# Patient Record
Sex: MALE | Race: BLACK OR AFRICAN AMERICAN | NOT HISPANIC OR LATINO | URBAN - METROPOLITAN AREA
[De-identification: names, ages, dates, MRNs, and addresses within clinical notes are randomized per-mention and may not be internally consistent; named-entity substitution may affect disease eponyms.]

---

## 2017-01-25 PROBLEM — Z00.00 ENCOUNTER FOR PREVENTIVE HEALTH EXAMINATION: Status: ACTIVE | Noted: 2017-01-25

## 2017-01-26 ENCOUNTER — OUTPATIENT (OUTPATIENT)
Dept: OUTPATIENT SERVICES | Facility: HOSPITAL | Age: 27
LOS: 1 days | End: 2017-01-26
Payer: COMMERCIAL

## 2017-01-26 ENCOUNTER — APPOINTMENT (OUTPATIENT)
Dept: ULTRASOUND IMAGING | Facility: CLINIC | Age: 27
End: 2017-01-26

## 2017-01-26 ENCOUNTER — APPOINTMENT (OUTPATIENT)
Dept: ULTRASOUND IMAGING | Facility: HOSPITAL | Age: 27
End: 2017-01-26

## 2017-01-26 DIAGNOSIS — I25.10 ATHEROSCLEROTIC HEART DISEASE OF NATIVE CORONARY ARTERY WITHOUT ANGINA PECTORIS: ICD-10-CM

## 2017-01-26 DIAGNOSIS — R91.1 SOLITARY PULMONARY NODULE: ICD-10-CM

## 2017-01-26 PROCEDURE — 76536 US EXAM OF HEAD AND NECK: CPT | Mod: 26

## 2017-01-26 PROCEDURE — 76536 US EXAM OF HEAD AND NECK: CPT

## 2017-06-11 ENCOUNTER — EMERGENCY (EMERGENCY)
Facility: HOSPITAL | Age: 27
LOS: 1 days | Discharge: HOME | End: 2017-06-11
Attending: EMERGENCY MEDICINE | Admitting: EMERGENCY MEDICINE
Payer: COMMERCIAL

## 2017-06-11 VITALS
SYSTOLIC BLOOD PRESSURE: 147 MMHG | DIASTOLIC BLOOD PRESSURE: 74 MMHG | TEMPERATURE: 98 F | HEART RATE: 90 BPM | OXYGEN SATURATION: 98 % | RESPIRATION RATE: 18 BRPM

## 2017-06-11 DIAGNOSIS — Z91.013 ALLERGY TO SEAFOOD: ICD-10-CM
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
## 2017-06-11 DIAGNOSIS — S39.012A STRAIN OF MUSCLE, FASCIA AND TENDON OF LOWER BACK, INITIAL ENCOUNTER: ICD-10-CM
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
## 2017-06-11 DIAGNOSIS — M54.5 LOW BACK PAIN: ICD-10-CM

## 2017-06-11 DIAGNOSIS — J45.909 UNSPECIFIED ASTHMA, UNCOMPLICATED: ICD-10-CM

## 2017-06-11 DIAGNOSIS — Z79.899 OTHER LONG TERM (CURRENT) DRUG THERAPY: ICD-10-CM
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
## 2017-06-11 DIAGNOSIS — S29.012A STRAIN OF MUSCLE AND TENDON OF BACK WALL OF THORAX, INITIAL ENCOUNTER: ICD-10-CM

## 2017-06-11 DIAGNOSIS — M54.6 PAIN IN THORACIC SPINE: ICD-10-CM
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
## 2017-06-11 PROCEDURE — 99283 EMERGENCY DEPT VISIT LOW MDM: CPT

## 2017-06-11 RX ORDER — IBUPROFEN 200 MG
800 TABLET ORAL ONCE
Qty: 0 | Refills: 0 | Status: COMPLETED | OUTPATIENT
Start: 2017-06-11 | End: 2017-06-11

## 2017-06-11 RX ADMIN — Medication 800 MILLIGRAM(S): at 20:45

## 2017-06-11 NOTE — ED PROVIDER NOTE - MEDICAL DECISION MAKING DETAILS
MD Cory,Attending: pt seen as above. thoraco-lumbar paraspinal strain. for nsaids and d/c for same and PMD followup. return for uncontrolled pain or other concerns

## 2017-06-11 NOTE — ED ADULT NURSE NOTE - OBJECTIVE STATEMENT
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

## 2017-06-11 NOTE — ED PROVIDER NOTE - PLAN OF CARE
take motrin (ibuprofen 600-800 mg with food every 6-8 hours for pain. Return for uncontrolled pain. weakness/numbness/tingling or any other concerns. Recheck by your doctor mid week. Limit activity next 2- days

## 2017-06-11 NOTE — ED PROVIDER NOTE - CARE PLAN
Principal Discharge DX:	Back strain, initial encounter  Instructions for follow-up, activity and diet:	take motrin (ibuprofen 600-800 mg with food every 6-8 hours for pain. Return for uncontrolled pain. weakness/numbness/tingling or any other concerns. Recheck by your doctor mid week. Limit activity next 2- days

## 2017-06-11 NOTE — ED PROVIDER NOTE - PHYSICAL EXAMINATION
tender to palpation along Left paraspinal muscles from upper lumber to thoracic area. No midline tenderness. NVI and ambulatory without difficulty.

## 2017-06-11 NOTE — ED PROVIDER NOTE - OBJECTIVE STATEMENT
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 Pain shoots up and down L side form lumbar to thorcic area. worse with twisting. No difficulty walking. No neuro  or extrem sxs.   PMHX/Meds/All: none

## 2017-06-15 PROCEDURE — 99283 EMERGENCY DEPT VISIT LOW MDM: CPT

## 2017-06-15 PROCEDURE — 87389 HIV-1 AG W/HIV-1&-2 AB AG IA: CPT

## 2017-06-25 ENCOUNTER — OUTPATIENT (OUTPATIENT)
Dept: OUTPATIENT SERVICES | Facility: HOSPITAL | Age: 27
LOS: 1 days | End: 2017-06-25
Payer: COMMERCIAL

## 2017-06-25 ENCOUNTER — APPOINTMENT (OUTPATIENT)
Dept: MRI IMAGING | Facility: CLINIC | Age: 27
End: 2017-06-25

## 2017-06-25 DIAGNOSIS — Z00.8 ENCOUNTER FOR OTHER GENERAL EXAMINATION: ICD-10-CM

## 2017-06-25 PROCEDURE — 72146 MRI CHEST SPINE W/O DYE: CPT

## 2017-06-25 PROCEDURE — 72148 MRI LUMBAR SPINE W/O DYE: CPT

## 2018-03-30 ENCOUNTER — OUTPATIENT (OUTPATIENT)
Dept: OUTPATIENT SERVICES | Facility: HOSPITAL | Age: 28
LOS: 1 days | End: 2018-03-30
Payer: COMMERCIAL

## 2018-03-30 DIAGNOSIS — I82.409 ACUTE EMBOLISM AND THROMBOSIS OF UNSPECIFIED DEEP VEINS OF UNSPECIFIED LOWER EXTREMITY: ICD-10-CM

## 2018-03-30 PROCEDURE — 73590 X-RAY EXAM OF LOWER LEG: CPT

## 2018-03-30 PROCEDURE — 73590 X-RAY EXAM OF LOWER LEG: CPT | Mod: 26,LT

## 2018-03-30 PROCEDURE — 93971 EXTREMITY STUDY: CPT

## 2018-03-30 PROCEDURE — 93971 EXTREMITY STUDY: CPT | Mod: 26

## 2018-04-06 ENCOUNTER — EMERGENCY (EMERGENCY)
Facility: HOSPITAL | Age: 28
LOS: 1 days | Discharge: HOME | End: 2018-04-06
Attending: EMERGENCY MEDICINE | Admitting: EMERGENCY MEDICINE
Payer: COMMERCIAL

## 2018-04-06 VITALS
OXYGEN SATURATION: 100 % | WEIGHT: 270.07 LBS | DIASTOLIC BLOOD PRESSURE: 85 MMHG | TEMPERATURE: 98 F | HEIGHT: 68 IN | SYSTOLIC BLOOD PRESSURE: 135 MMHG | RESPIRATION RATE: 18 BRPM | HEART RATE: 92 BPM

## 2018-04-06 PROCEDURE — 73030 X-RAY EXAM OF SHOULDER: CPT

## 2018-04-06 PROCEDURE — 73030 X-RAY EXAM OF SHOULDER: CPT | Mod: 26,RT

## 2018-04-06 PROCEDURE — 99283 EMERGENCY DEPT VISIT LOW MDM: CPT

## 2018-04-06 RX ORDER — IBUPROFEN 200 MG
600 TABLET ORAL ONCE
Qty: 0 | Refills: 0 | Status: COMPLETED | OUTPATIENT
Start: 2018-04-06 | End: 2018-04-06

## 2018-04-06 RX ORDER — ACETAMINOPHEN 500 MG
975 TABLET ORAL ONCE
Qty: 0 | Refills: 0 | Status: COMPLETED | OUTPATIENT
Start: 2018-04-06 | End: 2018-04-06

## 2018-04-06 RX ADMIN — Medication 600 MILLIGRAM(S): at 20:39

## 2018-04-06 RX ADMIN — Medication 975 MILLIGRAM(S): at 20:39

## 2018-04-06 NOTE — ED ADULT NURSE NOTE - OBJECTIVE STATEMENT
Presents c/o right shoulder pain s/p lifting patient. Pt A&Ox3. ROBERTS. +pulses. +sensation. Denies cp/sob. Respirations even/unlabored. Abd soft. No n/v/d. Denies urinary symptoms. Skin WDI.

## 2018-04-06 NOTE — ED PROVIDER NOTE - MEDICAL DECISION MAKING DETAILS
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 He stated he was helping to transfer pt to a bed from a chair and she felt weak like a "dead weight", and had to lift her up to keep her from falling to the ground and to transfer her onto the bed. c/o R shoulder pain suddenly after the incident today. Denies neck or back pain. Denies radiating pain. Denies sensory changes or weakness to extremities. Denies CP/SOB/ABD pain or N/V. Denies sensory changes or weakness to extremities.  DDx: sprain, rotator cuff injury, fx  Plan: xray shoulder, nsaids

## 2018-04-06 NOTE — ED PROVIDER NOTE - GASTROINTESTINAL NEGATIVE STATEMENT, MLM
no abdominal pain, no bloating, no constipation, no diarrhea, no nausea and no vomiting.
(3) no apparent problem

## 2018-04-06 NOTE — ED PROVIDER NOTE - PHYSICAL EXAMINATION
NAD, VSS, Afebrile, Obese, No spinal tender, + Right sided shoulder generalized tender without obvious swelling, full ROMs, N/V- intact.

## 2018-04-06 NOTE — ED PROVIDER NOTE - OBJECTIVE STATEMENT
27yo male employee, ambulatory, no significant PMHx c/o right dominant shoulder pain after lifting pt today. He stated he held pt from fall and lifted pt to the bed. Denies other injuries. Denies neck or back pain. Denies radiating pain. Denies sensory changes or weakness to extremities. Denies CP/SOB/ABD pain or N/V. Denies sensory changes or weakness to extremities.

## 2018-04-20 ENCOUNTER — OUTPATIENT (OUTPATIENT)
Dept: OUTPATIENT SERVICES | Facility: HOSPITAL | Age: 28
LOS: 1 days | End: 2018-04-20
Payer: COMMERCIAL

## 2018-04-20 DIAGNOSIS — R05 COUGH: ICD-10-CM

## 2018-04-20 PROCEDURE — 71046 X-RAY EXAM CHEST 2 VIEWS: CPT | Mod: 26

## 2018-04-20 PROCEDURE — 71046 X-RAY EXAM CHEST 2 VIEWS: CPT

## 2018-05-06 ENCOUNTER — OUTPATIENT (OUTPATIENT)
Dept: OUTPATIENT SERVICES | Facility: HOSPITAL | Age: 28
LOS: 1 days | End: 2018-05-06
Payer: COMMERCIAL

## 2018-05-06 ENCOUNTER — APPOINTMENT (OUTPATIENT)
Dept: MRI IMAGING | Facility: IMAGING CENTER | Age: 28
End: 2018-05-06
Payer: COMMERCIAL

## 2018-05-06 DIAGNOSIS — Z00.8 ENCOUNTER FOR OTHER GENERAL EXAMINATION: ICD-10-CM

## 2018-05-06 PROCEDURE — 73721 MRI JNT OF LWR EXTRE W/O DYE: CPT

## 2018-05-06 PROCEDURE — 73721 MRI JNT OF LWR EXTRE W/O DYE: CPT | Mod: 26,LT

## 2018-09-23 ENCOUNTER — EMERGENCY (EMERGENCY)
Facility: HOSPITAL | Age: 28
LOS: 1 days | Discharge: HOME | End: 2018-09-23
Attending: EMERGENCY MEDICINE
Payer: COMMERCIAL

## 2018-09-23 VITALS
TEMPERATURE: 99 F | RESPIRATION RATE: 18 BRPM | OXYGEN SATURATION: 97 % | HEART RATE: 98 BPM | SYSTOLIC BLOOD PRESSURE: 142 MMHG | DIASTOLIC BLOOD PRESSURE: 88 MMHG

## 2018-09-23 PROCEDURE — 99283 EMERGENCY DEPT VISIT LOW MDM: CPT

## 2018-09-23 RX ORDER — IBUPROFEN 200 MG
600 TABLET ORAL ONCE
Qty: 0 | Refills: 0 | Status: COMPLETED | OUTPATIENT
Start: 2018-09-23 | End: 2018-09-23

## 2018-09-23 RX ORDER — ACETAMINOPHEN 500 MG
975 TABLET ORAL ONCE
Qty: 0 | Refills: 0 | Status: COMPLETED | OUTPATIENT
Start: 2018-09-23 | End: 2018-09-23

## 2018-09-23 RX ADMIN — Medication 600 MILLIGRAM(S): at 20:13

## 2018-09-23 RX ADMIN — Medication 975 MILLIGRAM(S): at 20:13

## 2018-09-23 NOTE — ED PROVIDER NOTE - PROGRESS NOTE DETAILS
The patient was re-examined after interventions and is feeling much better.  The patient will follow up with their primary physician this week.

## 2018-09-23 NOTE — ED ADULT NURSE NOTE - NSIMPLEMENTINTERV_GEN_ALL_ED
Implemented All Universal Safety Interventions:  [place] to call system. Call bell, personal items and telephone within reach. Instruct patient to call for assistance. Room bathroom lighting operational. Non-slip footwear when patient is off stretcher. Physically safe environment: no spills, clutter or unnecessary equipment. Stretcher in lowest position, wheels locked, appropriate side rails in place.

## 2018-09-23 NOTE — ED PROVIDER NOTE - OBJECTIVE STATEMENT
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 Pt stated he felt low back pain while he was cleaning pt. Denies fall or other injuries. Denies radiating pain, sensory changes or weakness to extremities. Denies headache or neck pain. Denies fever, chills or recent sickness. Denies CP/SOB/ABD pain or N/V/D. Denies urinary problems. 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 Pt stated he felt low back pain while he was cleaning pt. Denies fall or other injuries. Denies radiating pain, sensory changes or weakness to extremities. Denies headache or neck pain. Denies fever, chills or recent sickness. Denies CP/SOB/ABD pain or N/V/D. Denies urinary problems. No bowel or bladder dysfunction, no sensory or motor loss in legs, no recent back surgeries or injections, no history of intravenous drug abuse, no fevers, chills or night sweats. No saddle numbness with wiping or self exam.

## 2018-09-23 NOTE — ED PROVIDER NOTE - MEDICAL DECISION MAKING DETAILS
Will treat pain, encourage stretching exercises, gave patient red flag signs and causes to return to the emergency department emergently.   Will reassess pain and dispo as clinically indicated.

## 2018-09-23 NOTE — ED PROVIDER NOTE - MUSCULOSKELETAL, MLM
Spine appears normal, range of motion is limited to pain actively but normal to passive movement, no joint tenderness. + paraspinal tenderness to palpation over lumbar region

## 2018-09-23 NOTE — ED PROVIDER NOTE - ATTENDING CONTRIBUTION TO CARE
Agree with above, Josue Mcdermott MD.   The patient was re-examined after interventions and is feeling much better.  The patient will follow up with their primary physician this week.  No immediate life threatening issues present on history or clinical exam. Patient is a safe disposition home, has capacity and insight into their condition, is ambulatory in the Emergency Department with no further questions and will follow up with their doctor(s) this week. Patient  understands anticipatory guidance and was given strict return and follow up precautions. The patient has been informed of all concerning signs and symptoms to return to Emergency Department, the necessity to follow up with the PMD/Clinic/follow up provided within 2-3 days was explained, and the patient reports understanding of above with capacity and insight.

## 2018-09-23 NOTE — ED PROVIDER NOTE - PHYSICAL EXAMINATION
NAD, VSS, Afebrile, No spinal mid tender, B/L para lumbar tender, No sciatica tender, Neuro- intact.

## 2018-09-23 NOTE — ED ADULT NURSE NOTE - OBJECTIVE STATEMENT
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 Did not fall. Pain is medial lumber spine, nonradiating. Steady gait, no urinary incontinence, no paresthesias. Stretcher locked in low position. Advised of plan of care.

## 2018-10-02 ENCOUNTER — APPOINTMENT (OUTPATIENT)
Dept: SPORTS MEDICINE | Facility: CLINIC | Age: 28
End: 2018-10-02

## 2018-10-11 ENCOUNTER — TRANSCRIPTION ENCOUNTER (OUTPATIENT)
Age: 28
End: 2018-10-11

## 2019-11-01 ENCOUNTER — APPOINTMENT (OUTPATIENT)
Dept: SURGERY | Facility: CLINIC | Age: 29
End: 2019-11-01

## 2019-11-01 DIAGNOSIS — J45.909 UNSPECIFIED ASTHMA, UNCOMPLICATED: ICD-10-CM

## 2019-12-06 ENCOUNTER — APPOINTMENT (OUTPATIENT)
Dept: SURGERY | Facility: CLINIC | Age: 29
End: 2019-12-06
Payer: COMMERCIAL

## 2019-12-06 VITALS
HEART RATE: 74 BPM | RESPIRATION RATE: 16 BRPM | OXYGEN SATURATION: 98 % | BODY MASS INDEX: 46.68 KG/M2 | SYSTOLIC BLOOD PRESSURE: 144 MMHG | DIASTOLIC BLOOD PRESSURE: 89 MMHG | WEIGHT: 308 LBS | TEMPERATURE: 98.2 F | HEIGHT: 68 IN

## 2019-12-06 DIAGNOSIS — Z83.79 FAMILY HISTORY OF OTHER DISEASES OF THE DIGESTIVE SYSTEM: ICD-10-CM

## 2019-12-06 PROBLEM — J45.909 ASTHMA: Status: ACTIVE | Noted: 2019-12-06

## 2019-12-06 PROCEDURE — 99205 OFFICE O/P NEW HI 60 MIN: CPT

## 2019-12-06 RX ORDER — CHROMIUM 200 MCG
TABLET ORAL
Refills: 0 | Status: ACTIVE | COMMUNITY

## 2019-12-06 NOTE — ASSESSMENT
[FreeTextEntry1] : 29-year-old male 5 foot 6 308 pounds with a BMI of 46.8. He would like to be considered for a sleeve gastrectomy I believe he would be an excellent candidate he will undergo her usual medical nutritional and psychological workup paternal preoperative support group meetings and return for a second office visit one shoulders are completed with the risks benefits and expectations were discussed at length with the patient all his questions were

## 2019-12-06 NOTE — HISTORY OF PRESENT ILLNESS
[de-identified] :  GALO BRIONES is a 29 year old male here for consultation for weight loss surgery.A 29-year-old male for further 308 pounds with a BMI of 46.8 he has been heavy most of his adult life he has been on multiple programs in the past losing up to 10 pounds within one problems skin and that weight back he is looking for more permanent solution to his weight loss probable is interested in a sleeve gastrectomy\par

## 2020-01-15 ENCOUNTER — APPOINTMENT (OUTPATIENT)
Dept: ULTRASOUND IMAGING | Facility: IMAGING CENTER | Age: 30
End: 2020-01-15

## 2020-01-15 ENCOUNTER — APPOINTMENT (OUTPATIENT)
Dept: MAMMOGRAPHY | Facility: IMAGING CENTER | Age: 30
End: 2020-01-15

## 2020-02-05 NOTE — ED PROVIDER NOTE - NS ED ROS FT
Considering difficulty in sending Rx for Revlimid to [facility] via e-scribing, we faxed it to [facility] at [phone] attn Michael today. Fax confirmation received.    No neuro sxs  No skin or extrem sxs.

## 2020-02-28 ENCOUNTER — TRANSCRIPTION ENCOUNTER (OUTPATIENT)
Age: 30
End: 2020-02-28

## 2020-04-04 ENCOUNTER — APPOINTMENT (OUTPATIENT)
Dept: DISASTER EMERGENCY | Facility: CLINIC | Age: 30
End: 2020-04-04

## 2020-04-26 ENCOUNTER — MESSAGE (OUTPATIENT)
Age: 30
End: 2020-04-26

## 2020-05-04 LAB
SARS-COV-2 IGG SERPL IA-ACNC: 5.2 INDEX
SARS-COV-2 IGG SERPL QL IA: POSITIVE

## 2020-08-13 ENCOUNTER — TRANSCRIPTION ENCOUNTER (OUTPATIENT)
Age: 30
End: 2020-08-13

## 2020-09-16 ENCOUNTER — RESULT REVIEW (OUTPATIENT)
Age: 30
End: 2020-09-16

## 2020-10-19 DIAGNOSIS — K29.50 UNSPECIFIED CHRONIC GASTRITIS W/OUT BLEEDING: ICD-10-CM

## 2020-11-18 ENCOUNTER — TRANSCRIPTION ENCOUNTER (OUTPATIENT)
Age: 30
End: 2020-11-18

## 2020-12-23 ENCOUNTER — EMERGENCY (EMERGENCY)
Facility: HOSPITAL | Age: 30
LOS: 1 days | Discharge: HOME | End: 2020-12-23
Attending: STUDENT IN AN ORGANIZED HEALTH CARE EDUCATION/TRAINING PROGRAM
Payer: COMMERCIAL

## 2020-12-23 ENCOUNTER — NON-APPOINTMENT (OUTPATIENT)
Age: 30
End: 2020-12-23

## 2020-12-23 ENCOUNTER — APPOINTMENT (OUTPATIENT)
Dept: CARDIOLOGY | Facility: CLINIC | Age: 30
End: 2020-12-23
Payer: COMMERCIAL

## 2020-12-23 VITALS
WEIGHT: 307.99 LBS | RESPIRATION RATE: 18 BRPM | HEIGHT: 68 IN | OXYGEN SATURATION: 97 % | SYSTOLIC BLOOD PRESSURE: 139 MMHG | HEART RATE: 90 BPM | TEMPERATURE: 99 F | DIASTOLIC BLOOD PRESSURE: 84 MMHG

## 2020-12-23 VITALS
HEIGHT: 65 IN | RESPIRATION RATE: 16 BRPM | BODY MASS INDEX: 50.98 KG/M2 | WEIGHT: 306 LBS | DIASTOLIC BLOOD PRESSURE: 86 MMHG | TEMPERATURE: 97.3 F | HEART RATE: 91 BPM | SYSTOLIC BLOOD PRESSURE: 132 MMHG

## 2020-12-23 DIAGNOSIS — Z79.4 TYPE 2 DIABETES MELLITUS WITH SEVERE NONPROLIFERATIVE DIABETIC RETINOPATHY WITHOUT MACULAR EDEMA, RIGHT EYE: ICD-10-CM

## 2020-12-23 DIAGNOSIS — E11.3491 TYPE 2 DIABETES MELLITUS WITH SEVERE NONPROLIFERATIVE DIABETIC RETINOPATHY WITHOUT MACULAR EDEMA, RIGHT EYE: ICD-10-CM

## 2020-12-23 PROCEDURE — 93971 EXTREMITY STUDY: CPT

## 2020-12-23 PROCEDURE — 99072 ADDL SUPL MATRL&STAF TM PHE: CPT

## 2020-12-23 PROCEDURE — 99284 EMERGENCY DEPT VISIT MOD MDM: CPT | Mod: 25

## 2020-12-23 PROCEDURE — 99203 OFFICE O/P NEW LOW 30 MIN: CPT

## 2020-12-23 PROCEDURE — 93971 EXTREMITY STUDY: CPT | Mod: 26

## 2020-12-23 PROCEDURE — 99284 EMERGENCY DEPT VISIT MOD MDM: CPT

## 2020-12-23 PROCEDURE — 93000 ELECTROCARDIOGRAM COMPLETE: CPT | Mod: NC

## 2020-12-23 RX ORDER — CEPHALEXIN 500 MG
1 CAPSULE ORAL
Qty: 28 | Refills: 0
Start: 2020-12-23 | End: 2020-12-29

## 2020-12-23 RX ORDER — CEPHALEXIN 500 MG
500 CAPSULE ORAL ONCE
Refills: 0 | Status: COMPLETED | OUTPATIENT
Start: 2020-12-23 | End: 2020-12-23

## 2020-12-23 RX ORDER — IBUPROFEN 200 MG
600 TABLET ORAL ONCE
Refills: 0 | Status: COMPLETED | OUTPATIENT
Start: 2020-12-23 | End: 2020-12-23

## 2020-12-23 RX ADMIN — Medication 500 MILLIGRAM(S): at 19:36

## 2020-12-23 RX ADMIN — Medication 600 MILLIGRAM(S): at 17:33

## 2020-12-23 NOTE — ED ADULT NURSE NOTE - OBJECTIVE STATEMENT
Pt is 29 y/o male hx htn, DM presents to the ED for atraumatic left calf pain. patient endorsing no trauma or injury to the area. 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

## 2020-12-23 NOTE — ED PROVIDER NOTE - ATTENDING CONTRIBUTION TO CARE
atraumatic L leg pain on the calf, started yesterday w/ redness, no rf for DVT/PEwill have ultrasound and likely dc home

## 2020-12-23 NOTE — ED PROVIDER NOTE - CLINICAL SUMMARY MEDICAL DECISION MAKING FREE TEXT BOX
no inciting injury noticed yesterday w/ approx 8 cm by 2 cm area of erythema on the medial aspect of the lle, will tx w/ cephalexin if dvt sono negative. 2+ dp pulse no leg swelling, intact ROM of knee and ankle on the L side

## 2020-12-23 NOTE — ED PROVIDER NOTE - PATIENT PORTAL LINK FT
You can access the FollowMyHealth Patient Portal offered by [facility] by registering at the following website: [email or web address]. By joining [facility]’s FollowMyHealth portal, you will also be able to view your health information using other applications (apps) compatible with our system.

## 2020-12-23 NOTE — ED ADULT NURSE NOTE - NSIMPLEMENTINTERV_GEN_ALL_ED
Implemented All Universal Safety Interventions:  [place] to call system. Call bell, personal items and telephone within reach. Instruct patient to call for assistance. Room bathroom lighting operational. Non-slip footwear when patient is off stretcher. Physically safe environment: no spills, clutter or unnecessary equipment. Stretcher in lowest position, wheels locked, appropriate side rails in place.

## 2020-12-23 NOTE — ED PROVIDER NOTE - MUSCULOSKELETAL, MLM
Spine appears normal, range of motion is not limited, left calf with superficial ttp c/w thrombophlebitis,

## 2020-12-23 NOTE — ED PROVIDER NOTE - OBJECTIVE STATEMENT
29 y/o male hx htn, DM presents to the ED for atraumatic left calf pain. patient endorsing no trauma or injury to the area. 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

## 2020-12-28 PROBLEM — E11.3491: Status: ACTIVE | Noted: 2020-12-28

## 2020-12-28 RX ORDER — LISINOPRIL AND HYDROCHLOROTHIAZIDE TABLETS 20; 25 MG/1; MG/1
20-25 TABLET ORAL
Refills: 0 | Status: DISCONTINUED | COMMUNITY
End: 2020-12-28

## 2020-12-30 ENCOUNTER — APPOINTMENT (OUTPATIENT)
Dept: CARDIOLOGY | Facility: CLINIC | Age: 30
End: 2020-12-30
Payer: COMMERCIAL

## 2020-12-30 VITALS
TEMPERATURE: 97.3 F | BODY MASS INDEX: 50.98 KG/M2 | HEIGHT: 65 IN | DIASTOLIC BLOOD PRESSURE: 85 MMHG | WEIGHT: 306 LBS | HEART RATE: 87 BPM | RESPIRATION RATE: 16 BRPM | SYSTOLIC BLOOD PRESSURE: 126 MMHG

## 2020-12-30 DIAGNOSIS — R06.00 DYSPNEA, UNSPECIFIED: ICD-10-CM

## 2020-12-30 DIAGNOSIS — R01.1 CARDIAC MURMUR, UNSPECIFIED: ICD-10-CM

## 2020-12-30 PROCEDURE — 93306 TTE W/DOPPLER COMPLETE: CPT

## 2020-12-30 PROCEDURE — 99213 OFFICE O/P EST LOW 20 MIN: CPT | Mod: 25

## 2020-12-30 PROCEDURE — 99072 ADDL SUPL MATRL&STAF TM PHE: CPT

## 2020-12-30 PROCEDURE — 93015 CV STRESS TEST SUPVJ I&R: CPT

## 2021-01-27 PROBLEM — R06.00 DYSPNEA ON EXERTION: Status: ACTIVE | Noted: 2020-12-23

## 2021-01-27 PROBLEM — R01.1 MURMUR: Status: ACTIVE | Noted: 2020-12-23

## 2021-02-04 LAB
25(OH)D3 SERPL-MCNC: 31.9 NG/ML
ALBUMIN SERPL ELPH-MCNC: 4.3 G/DL
ALP BLD-CCNC: 46 U/L
ALT SERPL-CCNC: 15 U/L
ANION GAP SERPL CALC-SCNC: 12 MMOL/L
APTT BLD: 33.8 SEC
AST SERPL-CCNC: 15 U/L
BASOPHILS # BLD AUTO: 0.02 K/UL
BASOPHILS NFR BLD AUTO: 0.3 %
BILIRUB SERPL-MCNC: 0.2 MG/DL
BUN SERPL-MCNC: 11 MG/DL
CALCIUM SERPL-MCNC: 9.9 MG/DL
CHLORIDE SERPL-SCNC: 106 MMOL/L
CO2 SERPL-SCNC: 22 MMOL/L
CREAT SERPL-MCNC: 0.98 MG/DL
EOSINOPHIL # BLD AUTO: 0.06 K/UL
EOSINOPHIL NFR BLD AUTO: 0.8 %
ESTIMATED AVERAGE GLUCOSE: 126 MG/DL
FOLATE SERPL-MCNC: 8.2 NG/ML
GLUCOSE SERPL-MCNC: 95 MG/DL
HBA1C MFR BLD HPLC: 6 %
HCT VFR BLD CALC: 39.5 %
HGB BLD-MCNC: 13 G/DL
IMM GRANULOCYTES NFR BLD AUTO: 0.6 %
INR PPP: 1.04 RATIO
IRON SERPL-MCNC: 56 UG/DL
LYMPHOCYTES # BLD AUTO: 2.2 K/UL
LYMPHOCYTES NFR BLD AUTO: 30.5 %
MAN DIFF?: NORMAL
MCHC RBC-ENTMCNC: 29.5 PG
MCHC RBC-ENTMCNC: 32.9 GM/DL
MCV RBC AUTO: 89.8 FL
MONOCYTES # BLD AUTO: 0.44 K/UL
MONOCYTES NFR BLD AUTO: 6.1 %
NEUTROPHILS # BLD AUTO: 4.45 K/UL
NEUTROPHILS NFR BLD AUTO: 61.7 %
PLATELET # BLD AUTO: 295 K/UL
POTASSIUM SERPL-SCNC: 4.5 MMOL/L
PROT SERPL-MCNC: 7.2 G/DL
PT BLD: 12.3 SEC
RBC # BLD: 4.4 M/UL
RBC # FLD: 14.4 %
SODIUM SERPL-SCNC: 139 MMOL/L
TSH SERPL-ACNC: 1.69 UIU/ML
VIT B12 SERPL-MCNC: 497 PG/ML
WBC # FLD AUTO: 7.21 K/UL

## 2021-02-08 ENCOUNTER — APPOINTMENT (OUTPATIENT)
Dept: ULTRASOUND IMAGING | Facility: HOSPITAL | Age: 31
End: 2021-02-08
Payer: COMMERCIAL

## 2021-02-08 ENCOUNTER — OUTPATIENT (OUTPATIENT)
Dept: OUTPATIENT SERVICES | Facility: HOSPITAL | Age: 31
LOS: 1 days | End: 2021-02-08
Payer: COMMERCIAL

## 2021-02-08 ENCOUNTER — RESULT REVIEW (OUTPATIENT)
Age: 31
End: 2021-02-08

## 2021-02-08 ENCOUNTER — APPOINTMENT (OUTPATIENT)
Dept: RADIOLOGY | Facility: HOSPITAL | Age: 31
End: 2021-02-08
Payer: COMMERCIAL

## 2021-02-08 DIAGNOSIS — Z01.818 ENCOUNTER FOR OTHER PREPROCEDURAL EXAMINATION: ICD-10-CM

## 2021-02-08 DIAGNOSIS — E66.01 MORBID (SEVERE) OBESITY DUE TO EXCESS CALORIES: ICD-10-CM

## 2021-02-08 LAB — VIT B1 SERPL-MCNC: 99.7 NMOL/L

## 2021-02-08 PROCEDURE — 74246 X-RAY XM UPR GI TRC 2CNTRST: CPT

## 2021-02-08 PROCEDURE — 76700 US EXAM ABDOM COMPLETE: CPT

## 2021-02-08 PROCEDURE — 76700 US EXAM ABDOM COMPLETE: CPT | Mod: 26

## 2021-02-08 PROCEDURE — 74246 X-RAY XM UPR GI TRC 2CNTRST: CPT | Mod: 26

## 2021-02-25 ENCOUNTER — OUTPATIENT (OUTPATIENT)
Dept: OUTPATIENT SERVICES | Facility: HOSPITAL | Age: 31
LOS: 1 days | End: 2021-02-25
Payer: COMMERCIAL

## 2021-02-25 VITALS
HEART RATE: 86 BPM | WEIGHT: 311.95 LBS | DIASTOLIC BLOOD PRESSURE: 76 MMHG | TEMPERATURE: 98 F | SYSTOLIC BLOOD PRESSURE: 121 MMHG | RESPIRATION RATE: 16 BRPM | HEIGHT: 68 IN | OXYGEN SATURATION: 98 %

## 2021-02-25 DIAGNOSIS — Z29.9 ENCOUNTER FOR PROPHYLACTIC MEASURES, UNSPECIFIED: ICD-10-CM

## 2021-02-25 DIAGNOSIS — Z01.818 ENCOUNTER FOR OTHER PREPROCEDURAL EXAMINATION: ICD-10-CM

## 2021-02-25 DIAGNOSIS — G47.33 OBSTRUCTIVE SLEEP APNEA (ADULT) (PEDIATRIC): ICD-10-CM

## 2021-02-25 DIAGNOSIS — E66.01 MORBID (SEVERE) OBESITY DUE TO EXCESS CALORIES: ICD-10-CM

## 2021-02-25 DIAGNOSIS — E11.9 TYPE 2 DIABETES MELLITUS WITHOUT COMPLICATIONS: ICD-10-CM

## 2021-02-25 LAB
A1C WITH ESTIMATED AVERAGE GLUCOSE RESULT: 6.2 % — HIGH (ref 4–5.6)
ALBUMIN SERPL ELPH-MCNC: 4.2 G/DL — SIGNIFICANT CHANGE UP (ref 3.3–5)
ALP SERPL-CCNC: 47 U/L — SIGNIFICANT CHANGE UP (ref 40–120)
ALT FLD-CCNC: 13 U/L — SIGNIFICANT CHANGE UP (ref 10–45)
ANION GAP SERPL CALC-SCNC: 14 MMOL/L — SIGNIFICANT CHANGE UP (ref 5–17)
AST SERPL-CCNC: 14 U/L — SIGNIFICANT CHANGE UP (ref 10–40)
BILIRUB SERPL-MCNC: 0.2 MG/DL — SIGNIFICANT CHANGE UP (ref 0.2–1.2)
BLD GP AB SCN SERPL QL: NEGATIVE — SIGNIFICANT CHANGE UP
BUN SERPL-MCNC: 15 MG/DL — SIGNIFICANT CHANGE UP (ref 7–23)
CALCIUM SERPL-MCNC: 9.1 MG/DL — SIGNIFICANT CHANGE UP (ref 8.4–10.5)
CHLORIDE SERPL-SCNC: 103 MMOL/L — SIGNIFICANT CHANGE UP (ref 96–108)
CO2 SERPL-SCNC: 20 MMOL/L — LOW (ref 22–31)
CREAT SERPL-MCNC: 0.73 MG/DL — SIGNIFICANT CHANGE UP (ref 0.5–1.3)
ESTIMATED AVERAGE GLUCOSE: 131 MG/DL — HIGH (ref 68–114)
GLUCOSE SERPL-MCNC: 120 MG/DL — HIGH (ref 70–99)
HCT VFR BLD CALC: 41.9 % — SIGNIFICANT CHANGE UP (ref 39–50)
HGB BLD-MCNC: 13.6 G/DL — SIGNIFICANT CHANGE UP (ref 13–17)
MCHC RBC-ENTMCNC: 29.1 PG — SIGNIFICANT CHANGE UP (ref 27–34)
MCHC RBC-ENTMCNC: 32.5 GM/DL — SIGNIFICANT CHANGE UP (ref 32–36)
MCV RBC AUTO: 89.7 FL — SIGNIFICANT CHANGE UP (ref 80–100)
NRBC # BLD: 0 /100 WBCS — SIGNIFICANT CHANGE UP (ref 0–0)
PLATELET # BLD AUTO: 303 K/UL — SIGNIFICANT CHANGE UP (ref 150–400)
POTASSIUM SERPL-MCNC: 4 MMOL/L — SIGNIFICANT CHANGE UP (ref 3.5–5.3)
POTASSIUM SERPL-SCNC: 4 MMOL/L — SIGNIFICANT CHANGE UP (ref 3.5–5.3)
PROT SERPL-MCNC: 7.8 G/DL — SIGNIFICANT CHANGE UP (ref 6–8.3)
RBC # BLD: 4.67 M/UL — SIGNIFICANT CHANGE UP (ref 4.2–5.8)
RBC # FLD: 14.4 % — SIGNIFICANT CHANGE UP (ref 10.3–14.5)
RH IG SCN BLD-IMP: POSITIVE — SIGNIFICANT CHANGE UP
SODIUM SERPL-SCNC: 137 MMOL/L — SIGNIFICANT CHANGE UP (ref 135–145)
WBC # BLD: 7.22 K/UL — SIGNIFICANT CHANGE UP (ref 3.8–10.5)
WBC # FLD AUTO: 7.22 K/UL — SIGNIFICANT CHANGE UP (ref 3.8–10.5)

## 2021-02-25 PROCEDURE — G0463: CPT

## 2021-02-25 PROCEDURE — 86901 BLOOD TYPING SEROLOGIC RH(D): CPT

## 2021-02-25 PROCEDURE — 80053 COMPREHEN METABOLIC PANEL: CPT

## 2021-02-25 PROCEDURE — 86900 BLOOD TYPING SEROLOGIC ABO: CPT

## 2021-02-25 PROCEDURE — 86850 RBC ANTIBODY SCREEN: CPT

## 2021-02-25 PROCEDURE — 85027 COMPLETE CBC AUTOMATED: CPT

## 2021-02-25 PROCEDURE — 83036 HEMOGLOBIN GLYCOSYLATED A1C: CPT

## 2021-02-25 RX ORDER — CEFAZOLIN SODIUM 1 G
3000 VIAL (EA) INJECTION ONCE
Refills: 0 | Status: COMPLETED | OUTPATIENT
Start: 2021-03-10 | End: 2021-03-10

## 2021-02-25 NOTE — H&P PST ADULT - NSICDXPROBLEM_GEN_ALL_CORE_FT
PROBLEM DIAGNOSES  Problem: Morbid obesity  Assessment and Plan: Laparoscopic vertical sleeve gastrectomy  Preop cbc, cmp, aic, t/s sent  Pending preop covid test on 03/08/2021    Problem: ALEX on CPAP  Assessment and Plan: Informed OR booking for precautions.     Problem: DM (diabetes mellitus)  Assessment and Plan: Will follow up with labs/ fingerstick.   HgA1c ordered   Instructed to hold metformin,last dose 03/09/21  STAT FS  on the day of surgery ordered     Problem: Prophylactic measure  Assessment and Plan: The Caprini score indicates this patient is at risk for a VTE event (score 3-5).  Most surgical patients in this group would benefit from pharmacologic prophylaxis.  The surgical team will determine the balance between VTE risk and bleeding risk        PROBLEM DIAGNOSES  Problem: ALEX on CPAP  Assessment and Plan: Informed OR booking for precautions.     Problem: Morbid obesity  Assessment and Plan: Laparoscopic vertical sleeve gastrectomy  Preop cbc, cmp, aic, t/s sent  Pending preop covid test on 03/08/2021   GATORADE G2, STOP DRINKING 2 HOURS BEFORE THE ARRIVAL TIME    Problem: DM (diabetes mellitus)  Assessment and Plan: Will follow up with labs/ fingerstick.   HgA1c ordered   Instructed to hold metformin,last dose 03/09/21  STAT FS  on the day of surgery ordered     Problem: Prophylactic measure  Assessment and Plan: The Caprini score indicates this patient is at risk for a VTE event (score 3-5).  Most surgical patients in this group would benefit from pharmacologic prophylaxis.  The surgical team will determine the balance between VTE risk and bleeding risk

## 2021-02-25 NOTE — H&P PST ADULT - HISTORY OF PRESENT ILLNESS
31 year old male with morbid obesity, HTN, DM-2 , asthma (controlled ,"last inhaler use years ago")  & had previous Covid infection( had  loss of taste & smell only for 2 weeks in 03/2021) reports having laparoscopic vertical sleeve gastrectomy on 03/10/21.   ***Pending covid test.  Patient  denies travel outside the country.  Denies  fever, cough, shortness of breadth, diarrhea, throat pain or any rash.  31 year old male with morbid obesity( BMI 47.4), HTN, DM-2 , asthma (controlled ,"last inhaler use years ago")  & had previous Covid infection( had  loss of taste & smell only for 2 weeks in 03/2021) reports having laparoscopic vertical sleeve gastrectomy on 03/10/21.   ***Pending covid test.  Patient  denies travel outside the country.  Denies  fever, cough, shortness of breadth, diarrhea, throat pain or any rash.

## 2021-02-25 NOTE — H&P PST ADULT - ASSESSMENT
DINESHI VTE 2.0 SCORE [CLOT updated 2019]    AGE RELATED RISK FACTORS                                                       MOBILITY RELATED FACTORS  [ ] Age 41-60 years                                            (1 Point)                    [ ] Bed rest                                                        (1 Point)  [ ] Age: 61-74 years                                           (2 Points)                  [ ] Plaster cast                                                   (2 Points)  [ ] Age= 75 years                                              (3 Points)                    [ ] Bed bound for more than 72 hours                 (2 Points)    DISEASE RELATED RISK FACTORS                                               GENDER SPECIFIC FACTORS  [ ] Edema in the lower extremities                       (1 Point)              [ ] Pregnancy                                                     (1 Point)  [ ] Varicose veins                                               (1 Point)                     [ ] Post-partum < 6 weeks                                   (1 Point)             [ x] BMI > 25 Kg/m2                                            (1 Point)                     [ ] Hormonal therapy  or oral contraception          (1 Point)                 [ ] Sepsis (in the previous month)                        (1 Point)               [ ] History of pregnancy complications                 (1 point)  [ ] Pneumonia or serious lung disease                                               [ ] Unexplained or recurrent                     (1 Point)           (in the previous month)                               (1 Point)  [ ] Abnormal pulmonary function test                     (1 Point)                 SURGERY RELATED RISK FACTORS  [ ] Acute myocardial infarction                              (1 Point)               [ ]  Section                                             (1 Point)  [ ] Congestive heart failure (in the previous month)  (1 Point)      [ ] Minor surgery                                                  (1 Point)   [ ] Inflammatory bowel disease                             (1 Point)               [ ] Arthroscopic surgery                                        (2 Points)  [ ] Central venous access                                      (2 Points)                [x ] General surgery lasting more than 45 minutes (2 points)  [ ] Malignancy- Present or previous                   (2 Points)                [ ] Elective arthroplasty                                         (5 points)    [ ] Stroke (in the previous month)                          (5 Points)                                                                                                                                                           HEMATOLOGY RELATED FACTORS                                                 TRAUMA RELATED RISK FACTORS  [ ] Prior episodes of VTE                                     (3 Points)                [ ] Fracture of the hip, pelvis, or leg                       (5 Points)  [ ] Positive family history for VTE                         (3 Points)             [ ] Acute spinal cord injury (in the previous month)  (5 Points)  [ ] Prothrombin 63670 A                                     (3 Points)               [ ] Paralysis  (less than 1 month)                             (5 Points)  [ ] Factor V Leiden                                             (3 Points)                  [ ] Multiple Trauma within 1 month                        (5 Points)  [ ] Lupus anticoagulants                                     (3 Points)                                                           [ ] Anticardiolipin antibodies                               (3 Points)                                                       [ ] High homocysteine in the blood                      (3 Points)                                             [ ] Other congenital or acquired thrombophilia      (3 Points)                                                [ ] Heparin induced thrombocytopenia                  (3 Points)                                     Total Score [       3   ]

## 2021-02-25 NOTE — H&P PST ADULT - NSICDXPASTMEDICALHX_GEN_ALL_CORE_FT
PAST MEDICAL HISTORY:  Asthma last inhaler use " years ago"    DM (diabetes mellitus) 2    Obesity, morbid      PAST MEDICAL HISTORY:  Asthma last inhaler use " years ago"    COVID-19 virus infection had loss of taste & smell for 2 weeks in 3/2021    DM (diabetes mellitus) 2    Morbid obesity BMI 47.4    Obesity, morbid

## 2021-03-04 PROBLEM — E66.01 MORBID (SEVERE) OBESITY DUE TO EXCESS CALORIES: Chronic | Status: ACTIVE | Noted: 2021-02-25

## 2021-03-04 PROBLEM — E11.9 TYPE 2 DIABETES MELLITUS WITHOUT COMPLICATIONS: Chronic | Status: ACTIVE | Noted: 2021-02-25

## 2021-03-04 PROBLEM — U07.1 COVID-19: Chronic | Status: ACTIVE | Noted: 2021-02-25

## 2021-03-05 ENCOUNTER — APPOINTMENT (OUTPATIENT)
Dept: SURGERY | Facility: CLINIC | Age: 31
End: 2021-03-05
Payer: COMMERCIAL

## 2021-03-05 VITALS
HEART RATE: 78 BPM | WEIGHT: 303 LBS | BODY MASS INDEX: 45.92 KG/M2 | OXYGEN SATURATION: 97 % | RESPIRATION RATE: 16 BRPM | TEMPERATURE: 98.6 F | HEIGHT: 68 IN | DIASTOLIC BLOOD PRESSURE: 81 MMHG | SYSTOLIC BLOOD PRESSURE: 149 MMHG

## 2021-03-05 PROCEDURE — 99072 ADDL SUPL MATRL&STAF TM PHE: CPT

## 2021-03-05 PROCEDURE — 99215 OFFICE O/P EST HI 40 MIN: CPT

## 2021-03-07 ENCOUNTER — OUTPATIENT (OUTPATIENT)
Dept: OUTPATIENT SERVICES | Facility: HOSPITAL | Age: 31
LOS: 1 days | End: 2021-03-07
Payer: COMMERCIAL

## 2021-03-07 DIAGNOSIS — Z11.52 ENCOUNTER FOR SCREENING FOR COVID-19: ICD-10-CM

## 2021-03-07 LAB — SARS-COV-2 RNA SPEC QL NAA+PROBE: SIGNIFICANT CHANGE UP

## 2021-03-07 PROCEDURE — U0005: CPT

## 2021-03-07 PROCEDURE — U0003: CPT

## 2021-03-07 PROCEDURE — C9803: CPT

## 2021-03-09 ENCOUNTER — TRANSCRIPTION ENCOUNTER (OUTPATIENT)
Age: 31
End: 2021-03-09

## 2021-03-10 ENCOUNTER — APPOINTMENT (OUTPATIENT)
Dept: SURGERY | Facility: HOSPITAL | Age: 31
End: 2021-03-10
Payer: COMMERCIAL

## 2021-03-10 ENCOUNTER — INPATIENT (INPATIENT)
Facility: HOSPITAL | Age: 31
LOS: 0 days | Discharge: HOME | DRG: 621 | End: 2021-03-11
Attending: SURGERY | Admitting: SURGERY
Payer: COMMERCIAL

## 2021-03-10 ENCOUNTER — RESULT REVIEW (OUTPATIENT)
Age: 31
End: 2021-03-10

## 2021-03-10 VITALS
DIASTOLIC BLOOD PRESSURE: 84 MMHG | OXYGEN SATURATION: 97 % | SYSTOLIC BLOOD PRESSURE: 144 MMHG | HEIGHT: 68 IN | TEMPERATURE: 98 F | WEIGHT: 301.59 LBS | HEART RATE: 59 BPM | RESPIRATION RATE: 18 BRPM

## 2021-03-10 DIAGNOSIS — E66.01 MORBID (SEVERE) OBESITY DUE TO EXCESS CALORIES: ICD-10-CM

## 2021-03-10 LAB
ANION GAP SERPL CALC-SCNC: 14 MMOL/L — SIGNIFICANT CHANGE UP (ref 5–17)
BASOPHILS # BLD AUTO: 0.02 K/UL — SIGNIFICANT CHANGE UP (ref 0–0.2)
BASOPHILS NFR BLD AUTO: 0.2 % — SIGNIFICANT CHANGE UP (ref 0–2)
BUN SERPL-MCNC: 15 MG/DL — SIGNIFICANT CHANGE UP (ref 7–23)
CALCIUM SERPL-MCNC: 9.3 MG/DL — SIGNIFICANT CHANGE UP (ref 8.4–10.5)
CHLORIDE SERPL-SCNC: 98 MMOL/L — SIGNIFICANT CHANGE UP (ref 96–108)
CO2 SERPL-SCNC: 20 MMOL/L — LOW (ref 22–31)
CREAT SERPL-MCNC: 0.89 MG/DL — SIGNIFICANT CHANGE UP (ref 0.5–1.3)
EOSINOPHIL # BLD AUTO: 0 K/UL — SIGNIFICANT CHANGE UP (ref 0–0.5)
EOSINOPHIL NFR BLD AUTO: 0 % — SIGNIFICANT CHANGE UP (ref 0–6)
GLUCOSE BLDC GLUCOMTR-MCNC: 107 MG/DL — HIGH (ref 70–99)
GLUCOSE BLDC GLUCOMTR-MCNC: 88 MG/DL — SIGNIFICANT CHANGE UP (ref 70–99)
GLUCOSE BLDC GLUCOMTR-MCNC: 90 MG/DL — SIGNIFICANT CHANGE UP (ref 70–99)
GLUCOSE SERPL-MCNC: 137 MG/DL — HIGH (ref 70–99)
HCT VFR BLD CALC: 38.3 % — LOW (ref 39–50)
HGB BLD-MCNC: 12.6 G/DL — LOW (ref 13–17)
IMM GRANULOCYTES NFR BLD AUTO: 0.5 % — SIGNIFICANT CHANGE UP (ref 0–1.5)
LYMPHOCYTES # BLD AUTO: 1.58 K/UL — SIGNIFICANT CHANGE UP (ref 1–3.3)
LYMPHOCYTES # BLD AUTO: 14.5 % — SIGNIFICANT CHANGE UP (ref 13–44)
MAGNESIUM SERPL-MCNC: 2.1 MG/DL — SIGNIFICANT CHANGE UP (ref 1.6–2.6)
MCHC RBC-ENTMCNC: 29.1 PG — SIGNIFICANT CHANGE UP (ref 27–34)
MCHC RBC-ENTMCNC: 32.9 GM/DL — SIGNIFICANT CHANGE UP (ref 32–36)
MCV RBC AUTO: 88.5 FL — SIGNIFICANT CHANGE UP (ref 80–100)
MONOCYTES # BLD AUTO: 0.26 K/UL — SIGNIFICANT CHANGE UP (ref 0–0.9)
MONOCYTES NFR BLD AUTO: 2.4 % — SIGNIFICANT CHANGE UP (ref 2–14)
NEUTROPHILS # BLD AUTO: 9.01 K/UL — HIGH (ref 1.8–7.4)
NEUTROPHILS NFR BLD AUTO: 82.4 % — HIGH (ref 43–77)
NRBC # BLD: 0 /100 WBCS — SIGNIFICANT CHANGE UP (ref 0–0)
PHOSPHATE SERPL-MCNC: 3.1 MG/DL — SIGNIFICANT CHANGE UP (ref 2.5–4.5)
PLATELET # BLD AUTO: 283 K/UL — SIGNIFICANT CHANGE UP (ref 150–400)
POTASSIUM SERPL-MCNC: 4.3 MMOL/L — SIGNIFICANT CHANGE UP (ref 3.5–5.3)
POTASSIUM SERPL-SCNC: 4.3 MMOL/L — SIGNIFICANT CHANGE UP (ref 3.5–5.3)
RBC # BLD: 4.33 M/UL — SIGNIFICANT CHANGE UP (ref 4.2–5.8)
RBC # FLD: 13.8 % — SIGNIFICANT CHANGE UP (ref 10.3–14.5)
RH IG SCN BLD-IMP: POSITIVE — SIGNIFICANT CHANGE UP
SODIUM SERPL-SCNC: 132 MMOL/L — LOW (ref 135–145)
WBC # BLD: 10.93 K/UL — HIGH (ref 3.8–10.5)
WBC # FLD AUTO: 10.93 K/UL — HIGH (ref 3.8–10.5)

## 2021-03-10 PROCEDURE — 43775 LAP SLEEVE GASTRECTOMY: CPT | Mod: 82

## 2021-03-10 PROCEDURE — 43775 LAP SLEEVE GASTRECTOMY: CPT

## 2021-03-10 PROCEDURE — 88305 TISSUE EXAM BY PATHOLOGIST: CPT | Mod: 26

## 2021-03-10 RX ORDER — DEXTROSE 50 % IN WATER 50 %
25 SYRINGE (ML) INTRAVENOUS ONCE
Refills: 0 | Status: DISCONTINUED | OUTPATIENT
Start: 2021-03-10 | End: 2021-03-11

## 2021-03-10 RX ORDER — ONDANSETRON 8 MG/1
4 TABLET, FILM COATED ORAL ONCE
Refills: 0 | Status: DISCONTINUED | OUTPATIENT
Start: 2021-03-10 | End: 2021-03-10

## 2021-03-10 RX ORDER — LIDOCAINE HCL 20 MG/ML
0.2 VIAL (ML) INJECTION ONCE
Refills: 0 | Status: DISCONTINUED | OUTPATIENT
Start: 2021-03-10 | End: 2021-03-10

## 2021-03-10 RX ORDER — GLUCAGON INJECTION, SOLUTION 0.5 MG/.1ML
1 INJECTION, SOLUTION SUBCUTANEOUS ONCE
Refills: 0 | Status: DISCONTINUED | OUTPATIENT
Start: 2021-03-10 | End: 2021-03-11

## 2021-03-10 RX ORDER — INSULIN LISPRO 100/ML
VIAL (ML) SUBCUTANEOUS EVERY 6 HOURS
Refills: 0 | Status: DISCONTINUED | OUTPATIENT
Start: 2021-03-10 | End: 2021-03-11

## 2021-03-10 RX ORDER — SODIUM CHLORIDE 9 MG/ML
1000 INJECTION, SOLUTION INTRAVENOUS
Refills: 0 | Status: DISCONTINUED | OUTPATIENT
Start: 2021-03-10 | End: 2021-03-11

## 2021-03-10 RX ORDER — ACETAMINOPHEN 500 MG
1000 TABLET ORAL ONCE
Refills: 0 | Status: COMPLETED | OUTPATIENT
Start: 2021-03-11 | End: 2021-03-11

## 2021-03-10 RX ORDER — HEPARIN SODIUM 5000 [USP'U]/ML
5000 INJECTION INTRAVENOUS; SUBCUTANEOUS EVERY 8 HOURS
Refills: 0 | Status: DISCONTINUED | OUTPATIENT
Start: 2021-03-10 | End: 2021-03-11

## 2021-03-10 RX ORDER — SODIUM CHLORIDE 9 MG/ML
3 INJECTION INTRAMUSCULAR; INTRAVENOUS; SUBCUTANEOUS EVERY 8 HOURS
Refills: 0 | Status: DISCONTINUED | OUTPATIENT
Start: 2021-03-10 | End: 2021-03-10

## 2021-03-10 RX ORDER — ONDANSETRON 8 MG/1
4 TABLET, FILM COATED ORAL EVERY 6 HOURS
Refills: 0 | Status: DISCONTINUED | OUTPATIENT
Start: 2021-03-10 | End: 2021-03-11

## 2021-03-10 RX ORDER — ACETAMINOPHEN 500 MG
1000 TABLET ORAL ONCE
Refills: 0 | Status: DISCONTINUED | OUTPATIENT
Start: 2021-03-11 | End: 2021-03-11

## 2021-03-10 RX ORDER — HYDROMORPHONE HYDROCHLORIDE 2 MG/ML
0.25 INJECTION INTRAMUSCULAR; INTRAVENOUS; SUBCUTANEOUS
Refills: 0 | Status: DISCONTINUED | OUTPATIENT
Start: 2021-03-10 | End: 2021-03-10

## 2021-03-10 RX ORDER — FOLIC ACID 0.8 MG
1 TABLET ORAL ONCE
Refills: 0 | Status: COMPLETED | OUTPATIENT
Start: 2021-03-10 | End: 2021-03-10

## 2021-03-10 RX ORDER — HALOPERIDOL DECANOATE 100 MG/ML
0.5 INJECTION INTRAMUSCULAR EVERY 4 HOURS
Refills: 0 | Status: DISCONTINUED | OUTPATIENT
Start: 2021-03-10 | End: 2021-03-11

## 2021-03-10 RX ORDER — FAMOTIDINE 10 MG/ML
20 INJECTION INTRAVENOUS ONCE
Refills: 0 | Status: COMPLETED | OUTPATIENT
Start: 2021-03-10 | End: 2021-03-10

## 2021-03-10 RX ORDER — DEXTROSE 50 % IN WATER 50 %
12.5 SYRINGE (ML) INTRAVENOUS ONCE
Refills: 0 | Status: DISCONTINUED | OUTPATIENT
Start: 2021-03-10 | End: 2021-03-11

## 2021-03-10 RX ORDER — KETOROLAC TROMETHAMINE 30 MG/ML
30 SYRINGE (ML) INJECTION EVERY 6 HOURS
Refills: 0 | Status: DISCONTINUED | OUTPATIENT
Start: 2021-03-10 | End: 2021-03-11

## 2021-03-10 RX ORDER — THIAMINE MONONITRATE (VIT B1) 100 MG
100 TABLET ORAL ONCE
Refills: 0 | Status: COMPLETED | OUTPATIENT
Start: 2021-03-10 | End: 2021-03-10

## 2021-03-10 RX ORDER — ACETAMINOPHEN 500 MG
1000 TABLET ORAL ONCE
Refills: 0 | Status: COMPLETED | OUTPATIENT
Start: 2021-03-10 | End: 2021-03-10

## 2021-03-10 RX ORDER — PANTOPRAZOLE SODIUM 20 MG/1
40 TABLET, DELAYED RELEASE ORAL EVERY 24 HOURS
Refills: 0 | Status: DISCONTINUED | OUTPATIENT
Start: 2021-03-10 | End: 2021-03-11

## 2021-03-10 RX ORDER — CEFAZOLIN SODIUM 1 G
3000 VIAL (EA) INJECTION EVERY 8 HOURS
Refills: 0 | Status: COMPLETED | OUTPATIENT
Start: 2021-03-10 | End: 2021-03-11

## 2021-03-10 RX ORDER — HYOSCYAMINE SULFATE 0.13 MG
0.12 TABLET ORAL EVERY 6 HOURS
Refills: 0 | Status: DISCONTINUED | OUTPATIENT
Start: 2021-03-10 | End: 2021-03-11

## 2021-03-10 RX ORDER — FENTANYL CITRATE 50 UG/ML
25 INJECTION INTRAVENOUS
Refills: 0 | Status: DISCONTINUED | OUTPATIENT
Start: 2021-03-10 | End: 2021-03-10

## 2021-03-10 RX ORDER — DIPHENHYDRAMINE HCL 50 MG
12.5 CAPSULE ORAL ONCE
Refills: 0 | Status: COMPLETED | OUTPATIENT
Start: 2021-03-10 | End: 2021-03-10

## 2021-03-10 RX ORDER — DEXTROSE 50 % IN WATER 50 %
15 SYRINGE (ML) INTRAVENOUS ONCE
Refills: 0 | Status: DISCONTINUED | OUTPATIENT
Start: 2021-03-10 | End: 2021-03-11

## 2021-03-10 RX ORDER — CHLORHEXIDINE GLUCONATE 213 G/1000ML
1 SOLUTION TOPICAL ONCE
Refills: 0 | Status: DISCONTINUED | OUTPATIENT
Start: 2021-03-10 | End: 2021-03-10

## 2021-03-10 RX ORDER — HEPARIN SODIUM 5000 [USP'U]/ML
5000 INJECTION INTRAVENOUS; SUBCUTANEOUS ONCE
Refills: 0 | Status: COMPLETED | OUTPATIENT
Start: 2021-03-10 | End: 2021-03-10

## 2021-03-10 RX ADMIN — Medication 30 MILLIGRAM(S): at 21:42

## 2021-03-10 RX ADMIN — Medication 0.12 MILLIGRAM(S): at 11:17

## 2021-03-10 RX ADMIN — Medication 30 MILLIGRAM(S): at 16:26

## 2021-03-10 RX ADMIN — Medication 12.5 MILLIGRAM(S): at 11:26

## 2021-03-10 RX ADMIN — ONDANSETRON 4 MILLIGRAM(S): 8 TABLET, FILM COATED ORAL at 23:37

## 2021-03-10 RX ADMIN — Medication 1 MILLIGRAM(S): at 12:09

## 2021-03-10 RX ADMIN — HEPARIN SODIUM 5000 UNIT(S): 5000 INJECTION INTRAVENOUS; SUBCUTANEOUS at 05:40

## 2021-03-10 RX ADMIN — Medication 0.12 MILLIGRAM(S): at 17:52

## 2021-03-10 RX ADMIN — Medication 400 MILLIGRAM(S): at 23:36

## 2021-03-10 RX ADMIN — HEPARIN SODIUM 5000 UNIT(S): 5000 INJECTION INTRAVENOUS; SUBCUTANEOUS at 13:51

## 2021-03-10 RX ADMIN — ONDANSETRON 4 MILLIGRAM(S): 8 TABLET, FILM COATED ORAL at 17:52

## 2021-03-10 RX ADMIN — FAMOTIDINE 20 MILLIGRAM(S): 10 INJECTION INTRAVENOUS at 11:26

## 2021-03-10 RX ADMIN — FENTANYL CITRATE 25 MICROGRAM(S): 50 INJECTION INTRAVENOUS at 13:00

## 2021-03-10 RX ADMIN — SODIUM CHLORIDE 150 MILLILITER(S): 9 INJECTION, SOLUTION INTRAVENOUS at 16:12

## 2021-03-10 RX ADMIN — PANTOPRAZOLE SODIUM 40 MILLIGRAM(S): 20 TABLET, DELAYED RELEASE ORAL at 11:17

## 2021-03-10 RX ADMIN — Medication 30 MILLIGRAM(S): at 16:11

## 2021-03-10 RX ADMIN — Medication 200 MILLIGRAM(S): at 17:52

## 2021-03-10 RX ADMIN — HEPARIN SODIUM 5000 UNIT(S): 5000 INJECTION INTRAVENOUS; SUBCUTANEOUS at 21:42

## 2021-03-10 RX ADMIN — SODIUM CHLORIDE 250 MILLILITER(S): 9 INJECTION, SOLUTION INTRAVENOUS at 11:17

## 2021-03-10 RX ADMIN — SODIUM CHLORIDE 150 MILLILITER(S): 9 INJECTION, SOLUTION INTRAVENOUS at 17:52

## 2021-03-10 RX ADMIN — Medication 400 MILLIGRAM(S): at 14:00

## 2021-03-10 RX ADMIN — Medication 0.12 MILLIGRAM(S): at 23:36

## 2021-03-10 RX ADMIN — Medication 100 MILLIGRAM(S): at 12:09

## 2021-03-10 RX ADMIN — ONDANSETRON 4 MILLIGRAM(S): 8 TABLET, FILM COATED ORAL at 11:17

## 2021-03-10 RX ADMIN — FENTANYL CITRATE 25 MICROGRAM(S): 50 INJECTION INTRAVENOUS at 12:45

## 2021-03-10 RX ADMIN — Medication 30 MILLIGRAM(S): at 22:00

## 2021-03-10 NOTE — BRIEF OPERATIVE NOTE - OPERATION/FINDINGS
laparoscopic sleeve gastrectomy. short gastric divided along greater curvature, proximal stomach  from left kaleb, 36Fr bougie in place, stomach stapled along greater curvature, negative leak test with methylene blue, omentum suture to edge of stomach, hemostasis achieved

## 2021-03-10 NOTE — CHART NOTE - NSCHARTNOTEFT_GEN_A_CORE
STATUS POST:  Laparoscopic sleeve gastrectomy     SUBJECTIVE: Pt seen in PACU 4 hours after procedure. Pt tolerated procedure well. Pt reports:  SOB:  [ ] YES [x ] NO  Chest Discomfort: [ ] YES [x ] NO    Nausea: [ x] YES [ ] NO           Vomiting: [ ] YES [ x] NO  Flatus: [ ] YES [ x] NO             Bowel Movement: [ ] YES [ x] NO  Diarrhea: [ ] YES [x ] NO         Void: [ ]YES [x ]No  Constipation: [ ] YES [ x] NO     Pain (0-10): 9/10              Pain Control Adequate: [ ] YES [x] NO      OBJECTIVE:  PHYSICAL EXAM:  Gen: AAOx3, NAD  HEENT: NC/AT  RESP: Non labored breathing  ABDOMEN: Soft, appropriately tender, obese abdomen. (+) Incision site dressing C/D/I    Vital Signs Last 24 Hrs  T(C): 36.3 (10 Mar 2021 13:00), Max: 36.7 (10 Mar 2021 05:55)  T(F): 97.3 (10 Mar 2021 13:00), Max: 98.1 (10 Mar 2021 05:55)  HR: 86 (10 Mar 2021 13:30) (59 - 93)  BP: 147/72 (10 Mar 2021 13:30) (122/78 - 175/85)  BP(mean): 101 (10 Mar 2021 13:30) (80 - 118)  RR: 18 (10 Mar 2021 13:30) (18 - 18)  SpO2: 99% (10 Mar 2021 13:30) (95% - 100%)      A/P: 31y Male s/p  Laparoscopic sleeve gastrectomy   - Diet: NPO  - Activity: OOBAAT  - AM Labs  - Pain medication STATUS POST:  Laparoscopic sleeve gastrectomy     SUBJECTIVE: Pt seen in PACU 4 hours after procedure. Pt tolerated procedure well. Pt reports:  SOB:  [ ] YES [x ] NO  Chest Discomfort: [ ] YES [x ] NO    Nausea: [ x] YES [ ] NO           Vomiting: [ ] YES [ x] NO  Flatus: [ ] YES [ x] NO             Bowel Movement: [ ] YES [ x] NO  Diarrhea: [ ] YES [x ] NO         Void: [ ]YES [x ]No  Constipation: [ ] YES [ x] NO     Pain (0-10): 9/10              Pain Control Adequate: [ ] YES [x] NO      OBJECTIVE:  PHYSICAL EXAM:  Gen: AAOx3, NAD  HEENT: NC/AT  RESP: Non labored breathing  ABDOMEN: Soft, appropriately tender, obese abdomen. (+) Incision site dressing C/D/I    Vital Signs Last 24 Hrs  T(C): 36.3 (10 Mar 2021 13:00), Max: 36.7 (10 Mar 2021 05:55)  T(F): 97.3 (10 Mar 2021 13:00), Max: 98.1 (10 Mar 2021 05:55)  HR: 86 (10 Mar 2021 13:30) (59 - 93)  BP: 147/72 (10 Mar 2021 13:30) (122/78 - 175/85)  BP(mean): 101 (10 Mar 2021 13:30) (80 - 118)  RR: 18 (10 Mar 2021 13:30) (18 - 18)  SpO2: 99% (10 Mar 2021 13:30) (95% - 100%)      A/P: 31y Male s/p  Laparoscopic sleeve gastrectomy   - Diet: NPO  - Activity: OOBAAT  - AM Labs  - Pain medication  - Pt cleared for the floors

## 2021-03-10 NOTE — PRE-ANESTHESIA EVALUATION ADULT - NSANTHPMHFT_GEN_ALL_CORE
31M with PMH of morbid obesity (BMI 48), HTN, T2DM (A1c 6.2), asthma (well controlled, last used inhaler years ago, never intubated), ALEX (on CPAP at night), covid infection (03/2020, loss of smell/taste, no hospitalization) here for above procedure. Pt has echo and PFTs in physical chart. PFT showing asthma. 31M with PMH of morbid obesity (BMI 48), HTN, T2DM (A1c 6.2), asthma (well controlled, last used inhaler years ago, never intubated), ALEX (on CPAP at night), covid infection (03/2020, loss of smell/taste, no hospitalization) here for above procedure. Pt has echo and PFTs in physical chart. PFT showing asthma. Pt never had anesthesia before, but reports no issues in the family with anesthesia. He is able to walk several blocks without chest pain or significant SOB.

## 2021-03-11 ENCOUNTER — TRANSCRIPTION ENCOUNTER (OUTPATIENT)
Age: 31
End: 2021-03-11

## 2021-03-11 VITALS
HEART RATE: 82 BPM | OXYGEN SATURATION: 98 % | RESPIRATION RATE: 18 BRPM | DIASTOLIC BLOOD PRESSURE: 72 MMHG | SYSTOLIC BLOOD PRESSURE: 121 MMHG | TEMPERATURE: 98 F

## 2021-03-11 LAB
ANION GAP SERPL CALC-SCNC: 11 MMOL/L — SIGNIFICANT CHANGE UP (ref 5–17)
BASOPHILS # BLD AUTO: 0 K/UL — SIGNIFICANT CHANGE UP (ref 0–0.2)
BASOPHILS NFR BLD AUTO: 0 % — SIGNIFICANT CHANGE UP (ref 0–2)
BUN SERPL-MCNC: 10 MG/DL — SIGNIFICANT CHANGE UP (ref 7–23)
CALCIUM SERPL-MCNC: 9.3 MG/DL — SIGNIFICANT CHANGE UP (ref 8.4–10.5)
CHLORIDE SERPL-SCNC: 104 MMOL/L — SIGNIFICANT CHANGE UP (ref 96–108)
CO2 SERPL-SCNC: 21 MMOL/L — LOW (ref 22–31)
CREAT SERPL-MCNC: 0.85 MG/DL — SIGNIFICANT CHANGE UP (ref 0.5–1.3)
EOSINOPHIL # BLD AUTO: 0 K/UL — SIGNIFICANT CHANGE UP (ref 0–0.5)
EOSINOPHIL NFR BLD AUTO: 0 % — SIGNIFICANT CHANGE UP (ref 0–6)
GLUCOSE BLDC GLUCOMTR-MCNC: 88 MG/DL — SIGNIFICANT CHANGE UP (ref 70–99)
GLUCOSE BLDC GLUCOMTR-MCNC: 95 MG/DL — SIGNIFICANT CHANGE UP (ref 70–99)
GLUCOSE SERPL-MCNC: 90 MG/DL — SIGNIFICANT CHANGE UP (ref 70–99)
HCT VFR BLD CALC: 35.8 % — LOW (ref 39–50)
HGB BLD-MCNC: 12.2 G/DL — LOW (ref 13–17)
IMM GRANULOCYTES NFR BLD AUTO: 0.5 % — SIGNIFICANT CHANGE UP (ref 0–1.5)
LYMPHOCYTES # BLD AUTO: 1.26 K/UL — SIGNIFICANT CHANGE UP (ref 1–3.3)
LYMPHOCYTES # BLD AUTO: 16.3 % — SIGNIFICANT CHANGE UP (ref 13–44)
MAGNESIUM SERPL-MCNC: 2.3 MG/DL — SIGNIFICANT CHANGE UP (ref 1.6–2.6)
MCHC RBC-ENTMCNC: 29.8 PG — SIGNIFICANT CHANGE UP (ref 27–34)
MCHC RBC-ENTMCNC: 34.1 GM/DL — SIGNIFICANT CHANGE UP (ref 32–36)
MCV RBC AUTO: 87.3 FL — SIGNIFICANT CHANGE UP (ref 80–100)
MONOCYTES # BLD AUTO: 0.58 K/UL — SIGNIFICANT CHANGE UP (ref 0–0.9)
MONOCYTES NFR BLD AUTO: 7.5 % — SIGNIFICANT CHANGE UP (ref 2–14)
NEUTROPHILS # BLD AUTO: 5.86 K/UL — SIGNIFICANT CHANGE UP (ref 1.8–7.4)
NEUTROPHILS NFR BLD AUTO: 75.7 % — SIGNIFICANT CHANGE UP (ref 43–77)
NRBC # BLD: 0 /100 WBCS — SIGNIFICANT CHANGE UP (ref 0–0)
PHOSPHATE SERPL-MCNC: 3.1 MG/DL — SIGNIFICANT CHANGE UP (ref 2.5–4.5)
PLATELET # BLD AUTO: 270 K/UL — SIGNIFICANT CHANGE UP (ref 150–400)
POTASSIUM SERPL-MCNC: 3.8 MMOL/L — SIGNIFICANT CHANGE UP (ref 3.5–5.3)
POTASSIUM SERPL-SCNC: 3.8 MMOL/L — SIGNIFICANT CHANGE UP (ref 3.5–5.3)
RBC # BLD: 4.1 M/UL — LOW (ref 4.2–5.8)
RBC # FLD: 13.8 % — SIGNIFICANT CHANGE UP (ref 10.3–14.5)
SODIUM SERPL-SCNC: 136 MMOL/L — SIGNIFICANT CHANGE UP (ref 135–145)
WBC # BLD: 7.74 K/UL — SIGNIFICANT CHANGE UP (ref 3.8–10.5)
WBC # FLD AUTO: 7.74 K/UL — SIGNIFICANT CHANGE UP (ref 3.8–10.5)

## 2021-03-11 PROCEDURE — 83735 ASSAY OF MAGNESIUM: CPT

## 2021-03-11 PROCEDURE — 88305 TISSUE EXAM BY PATHOLOGIST: CPT

## 2021-03-11 PROCEDURE — 80048 BASIC METABOLIC PNL TOTAL CA: CPT

## 2021-03-11 PROCEDURE — 82962 GLUCOSE BLOOD TEST: CPT

## 2021-03-11 PROCEDURE — C1889: CPT

## 2021-03-11 PROCEDURE — 84100 ASSAY OF PHOSPHORUS: CPT

## 2021-03-11 PROCEDURE — C9399: CPT

## 2021-03-11 PROCEDURE — 85025 COMPLETE CBC W/AUTO DIFF WBC: CPT

## 2021-03-11 RX ORDER — OMEPRAZOLE 10 MG/1
1 CAPSULE, DELAYED RELEASE ORAL
Qty: 30 | Refills: 0
Start: 2021-03-11 | End: 2021-04-09

## 2021-03-11 RX ORDER — LISINOPRIL 2.5 MG/1
1 TABLET ORAL
Qty: 0 | Refills: 0 | DISCHARGE

## 2021-03-11 RX ORDER — AMLODIPINE BESYLATE 2.5 MG/1
1 TABLET ORAL
Qty: 0 | Refills: 0 | DISCHARGE

## 2021-03-11 RX ORDER — METFORMIN HYDROCHLORIDE 850 MG/1
1 TABLET ORAL
Qty: 0 | Refills: 0 | DISCHARGE

## 2021-03-11 RX ORDER — ACETAMINOPHEN 500 MG
15 TABLET ORAL
Qty: 300 | Refills: 0
Start: 2021-03-11 | End: 2021-03-15

## 2021-03-11 RX ORDER — ONDANSETRON 8 MG/1
1 TABLET, FILM COATED ORAL
Qty: 21 | Refills: 0
Start: 2021-03-11 | End: 2021-03-17

## 2021-03-11 RX ORDER — HYOSCYAMINE SULFATE 0.13 MG
1 TABLET ORAL
Qty: 28 | Refills: 0
Start: 2021-03-11 | End: 2021-03-17

## 2021-03-11 RX ADMIN — Medication 30 MILLIGRAM(S): at 07:00

## 2021-03-11 RX ADMIN — HEPARIN SODIUM 5000 UNIT(S): 5000 INJECTION INTRAVENOUS; SUBCUTANEOUS at 06:40

## 2021-03-11 RX ADMIN — Medication 0.12 MILLIGRAM(S): at 11:33

## 2021-03-11 RX ADMIN — SODIUM CHLORIDE 150 MILLILITER(S): 9 INJECTION, SOLUTION INTRAVENOUS at 06:45

## 2021-03-11 RX ADMIN — Medication 1000 MILLIGRAM(S): at 08:55

## 2021-03-11 RX ADMIN — Medication 400 MILLIGRAM(S): at 08:24

## 2021-03-11 RX ADMIN — ONDANSETRON 4 MILLIGRAM(S): 8 TABLET, FILM COATED ORAL at 06:03

## 2021-03-11 RX ADMIN — PANTOPRAZOLE SODIUM 40 MILLIGRAM(S): 20 TABLET, DELAYED RELEASE ORAL at 11:33

## 2021-03-11 RX ADMIN — Medication 30 MILLIGRAM(S): at 11:33

## 2021-03-11 RX ADMIN — Medication 30 MILLIGRAM(S): at 06:06

## 2021-03-11 RX ADMIN — Medication 1000 MILLIGRAM(S): at 01:00

## 2021-03-11 RX ADMIN — Medication 200 MILLIGRAM(S): at 00:02

## 2021-03-11 RX ADMIN — Medication 0.12 MILLIGRAM(S): at 06:04

## 2021-03-11 NOTE — DIETITIAN INITIAL EVALUATION ADULT. - REASON
Nutrition Focused Physical exam deferred at this time; pt with no overt signs of muscle/fat wasting noted

## 2021-03-11 NOTE — DISCHARGE NOTE PROVIDER - NS AS DC PROVIDER CONTACT Y/N MULTI
Dr. Cox reviewed CPAP download from 12/11/2019-01/09/2020.  Per Dr. Cox decrease pressures to 13-16cm H20 and download in 2 weeks.\"  RN called and left message notifying patient.    RN faxed order to [facility] at [phone] and received confirmation fax.     Yes

## 2021-03-11 NOTE — DISCHARGE NOTE PROVIDER - NSDCMRMEDTOKEN_GEN_ALL_CORE_FT
amLODIPine 5 mg oral tablet: 1 tab(s) orally once a day  hyoscyamine 0.125 mg sublingual tablet: 1 tab(s) sublingual every 6 hours, As Needed   lisinopril 40 mg oral tablet: 1 tab(s) orally once a day  metFORMIN 1000 mg oral tablet: 1 tab(s) orally once a day, last dose 3/09 am  omeprazole 40 mg oral delayed release capsule: 1 cap(s) orally once a day     Please open capsule and mix in applesauce  ondansetron 4 mg oral disintegrating strip: 1 each orally every 8 hours, As Needed   Tylenol Extra Strength 500 mg/15 mL oral liquid: 15 milliliter(s) orally every 6 hours, As Needed    hyoscyamine 0.125 mg sublingual tablet: 1 tab(s) sublingual every 6 hours, As Needed   omeprazole 40 mg oral delayed release capsule: 1 cap(s) orally once a day     Please open capsule and mix in applesauce  ondansetron 4 mg oral disintegrating strip: 1 each orally every 8 hours, As Needed   Tylenol Extra Strength 500 mg/15 mL oral liquid: 15 milliliter(s) orally every 6 hours, As Needed

## 2021-03-11 NOTE — PROGRESS NOTE ADULT - ASSESSMENT
31y Male s/p  Laparoscopic sleeve gastrectomy (3/10)  - Diet:Bariclears  - Activity: OOBAAT  - f/u AM Labs  - Pain management  - strict IOs  - Monitor vitals  - VTE ppx    [facility]  p.[phone] 31y Male now POD1 s/p  Laparoscopic sleeve gastrectomy (3/10) recovering well on the floors.    PLAN:  - Sleeve protocol  - Diet:Bar[place]ars  - Activity: OOBAAT  - f/u AM Labs  - Pain management  - strict IOs  - Monitor vitals  - VTE ppx  - DC home today    [place] [facility]  p.[phone]

## 2021-03-11 NOTE — DIETITIAN INITIAL EVALUATION ADULT. - OTHER INFO
Pt seen for bariatric surgery consult on 2MON. Pt with multiple previous wt loss attempts per chart. Pt tried the following programs: cutting back on portion sizes, intermittent fasting among others and was unable to lose and maintain significant wt loss. Per chart, pt met with outpatient RD and weighed 302 pounds (8/2/20). Pre-surgical wt (H&P) noted as 312 pounds (2/25/21). Current wt of 301 pounds (3/10/21). Pt now S/P laparoscopic vertical sleeve gastrectomy. Pt denies N+V, reports passing gas, sipping on bariatric clear liquids during RD visit. Pt with knowledge of bariatric full liquid diet and receptive to in depth review/reinforcement. Pt reports home stock of protein shakes with plans to purchase more. Pt reports purchasing the necessary vitamins/minerals in chewable form including a multivitamin with added elemental iron, calcium citrate with vitamin D, and sublingual B12. Pt was advised to take the multivitamin with elemental iron at least 2 hours apart from the calcium citrate with vitamin D for optimal absorption. Pt plans to schedule a follow up appointment with outpatient RD. Pt able to teach back all points discussed during interview.     1. Laparoscopic sleeve gastrectomy recommendations reviewed/reinforced (discharge instruction handout references). Bariatric full liquid diet reviewed- sugar free, caffeine free, no carbonated beverages, low fat, no straws, no chewing gum, 6 small meals/day gradually increasing volume (4oz-6oz-8oz), and sipping beverages slowly, no water during meals- drink water 1 hour before or after meals, meeting hydration and protein needs. Discussed vitamin/mineral supplement compliance as discussed with team. Pt encouraged to follow-up with outpatient RD. 2. RD to remain available to reinforce nutrition education as requested by patient. Pt seen for bariatric surgery consult on 2MON. Pt with multiple previous wt loss attempts per chart. Pt tried the following programs: cutting back on portion sizes, intermittent fasting among others and was unable to lose and maintain significant wt loss. Per chart, pt met with outpatient RD and weighed 302 pounds (8/2/20). Pre-surgical wt noted as 312 pounds (2/25/21). Current wt of 301 pounds (3/10/21). Pt now S/P laparoscopic vertical sleeve gastrectomy. Pt denies N+V, reports passing gas, sipping on bariatric clear liquids during RD visit. Pt with knowledge of bariatric full liquid diet and receptive to in depth review/reinforcement. Pt reports home stock of protein shakes with plans to purchase more. Pt reports purchasing the necessary vitamins/minerals in chewable form including a multivitamin with added elemental iron, calcium citrate with vitamin D, and sublingual B12. Pt was advised to take the multivitamin with elemental iron at least 2 hours apart from the calcium citrate with vitamin D for optimal absorption. Pt plans to schedule a follow up appointment with outpatient RD. Pt able to teach back all points discussed during interview.     1. Laparoscopic sleeve gastrectomy recommendations reviewed/reinforced (discharge instruction handout references). Bariatric full liquid diet reviewed- sugar free, caffeine free, no carbonated beverages, low fat, no straws, no chewing gum, 6 small meals/day gradually increasing volume (4oz-6oz-8oz), and sipping beverages slowly, no water during meals- drink water 1 hour before or after meals, meeting hydration and protein needs. Discussed vitamin/mineral supplement compliance as discussed with team. Pt encouraged to follow-up with outpatient RD. 2. RD to remain available to reinforce nutrition education as requested by patient.

## 2021-03-11 NOTE — DISCHARGE NOTE PROVIDER - NSDCCPTREATMENT_GEN_ALL_CORE_FT
PRINCIPAL PROCEDURE  Procedure: Laparoscopic sleeve gastrectomy  Findings and Treatment: Bariatric diet, increase oral sugar-free liquid intake, dietary supplement, increase physical activity

## 2021-03-11 NOTE — DIETITIAN INITIAL EVALUATION ADULT. - PERTINENT MEDS FT
MEDICATIONS  (STANDING):  acetaminophen  IVPB .. 1000 milliGRAM(s) IV Intermittent once  dextrose 40% Gel 15 Gram(s) Oral once  dextrose 5%. 1000 milliLiter(s) (50 mL/Hr) IV Continuous <Continuous>  dextrose 5%. 1000 milliLiter(s) (100 mL/Hr) IV Continuous <Continuous>  dextrose 50% Injectable 25 Gram(s) IV Push once  dextrose 50% Injectable 12.5 Gram(s) IV Push once  dextrose 50% Injectable 25 Gram(s) IV Push once  glucagon  Injectable 1 milliGRAM(s) IntraMuscular once  heparin   Injectable 5000 Unit(s) SubCutaneous every 8 hours  hyoscyamine SL 0.125 milliGRAM(s) SubLingual every 6 hours  insulin lispro (ADMELOG) corrective regimen sliding scale   SubCutaneous every 6 hours  lactated ringers. 1000 milliLiter(s) (150 mL/Hr) IV Continuous <Continuous>  ondansetron Injectable 4 milliGRAM(s) IV Push every 6 hours  pantoprazole  Injectable 40 milliGRAM(s) IV Push every 24 hours  sodium chloride 0.9% 1000 milliLiter(s) (250 mL/Hr) IV Continuous <Continuous>    MEDICATIONS  (PRN):  haloperidol IVPB 0.5 milliGRAM(s) IV Intermittent every 4 hours PRN nausea and/or vomiting

## 2021-03-11 NOTE — DISCHARGE NOTE PROVIDER - INSTRUCTIONS
Bariatric phase 1 full liquid diet x2 weeks.  Avoid carbonated, sugary drinks.  Follow up with your dietician in 30 days.

## 2021-03-11 NOTE — DISCHARGE NOTE PROVIDER - CARE PROVIDER_API CALL
Krzysztof Cronin)  Surgery  [address], [place], [address]  [place] [address]  Phone: [phone]  Fax: [phone]  Follow Up Time: 1 week

## 2021-03-11 NOTE — DIETITIAN INITIAL EVALUATION ADULT. - ORAL INTAKE PTA/DIET HISTORY
Pt reports following a full liquid diet for 2 weeks PTA as instructed by outpatient RD. Pt reports having premier protein shakes daily along with other liquid items like soups, yogurts. Pt reports food allergy to shellfish. Pt was not taking any vitamins PTA. Pt reports following a full liquid diet for 2 weeks PTA as instructed by outpatient RD. Pt reports having premier protein shakes daily along with other liquid items like soups, yogurts. Pt reports food allergy to shellfish. No chewing/swallowing difficulties reported. Pt was not taking any vitamins PTA.

## 2021-03-11 NOTE — DIETITIAN INITIAL EVALUATION ADULT. - CHIEF COMPLAINT
"31 year old male with morbid obesity( BMI 47.4), HTN, DM-2, asthma (controlled ,"last inhaler use years ago")  & had previous Covid infection admitted for laparoscopic vertical sleeve gastrectomy.

## 2021-03-11 NOTE — PHARMACOTHERAPY INTERVENTION NOTE - COMMENTS
S/p sleeve gastrectomy on 3/10/2021.  Patient medication reconciliation completed. Patient currently taking:     Amlodipine 5mg tab by mouth daily   Lisinoprol 40mg tab by mouth daily  Metformin 1000mg tab by mouth daily  Advil PM PRN    Patient was instructed to use crushed, dissolvable, chewable, or liquid formulations of medications for 1 month. Patient was informed to take daily multivitamins post surgically. Patient reeducated on NSAID avoidance (ibuprofen, ASA, naproxen, aleve) as they increased risk of GI bleeding; may use APAP for mild pain otherwise contact prescriber for consult. Patient was informed on indications and directions for administration for hyoscyamine SL, acetaminophen liquid, ondansetron ODT, and omeprazole DR. Pill  provided. Patient was instructed to take the medications as follows:    Stop taking amlodipine, lisinopril, metformin per PCP (crush if plans to resume)  Stop advil PM after surgery. Patient informed that diphenhydramine (in PM formulation) may increase risk of dehydration    No pre-op opioids  Post-op opioids:    PACU: 7.5 MME (fentanyl IV)    2 [facility]: 0 MME  Home: 0 MME    Mushtaq Butler, PharmD, BCPS  [phone]

## 2021-03-11 NOTE — DISCHARGE NOTE PROVIDER - NSDCFUADDINST_GEN_ALL_CORE_FT
WOUND CARE: You may shower. Pat Dry abdomen. Leave the white steri strips in place, they will fall off on their own in approximately 5-7 days.   BATHING: Please do not submerge wound underwater. You may shower and/or sponge bathe.  ACTIVITY: No heavy lifting anything more than 10-15lbs or straining. Otherwise, you may return to your usual level of physical activity.    NOTIFY YOUR SURGEON IF: You have any bleeding that does not stop, any pus draining from your wound, any fever (over 100.4 F) or chills, persistent nausea/vomiting with inability to tolerate food or liquids, persistent diarrhea, or if your pain is not controlled on your discharge pain medications.  FOLLOW-UP:  1. Please call to make a follow-up appointment within one week of discharge   2. Please follow up with your primary care physician in one week regarding your hospitalization.  WOUND CARE: Remove the outer bandages toomorrow.  Underneath are steri strips. Leave the white steri strips in place, they will fall off on their own in approximately 5-7 days. You may shower. Pat Dry abdomen.  BATHING: Please do not submerge wound underwater. You may shower and/or sponge bathe.  ACTIVITY: No heavy lifting anything more than 10-15lbs or straining. Otherwise, you may return to your usual level of physical activity.    NOTIFY YOUR SURGEON IF: You have any bleeding that does not stop, any pus draining from your wound, any fever (over 100.4 F) or chills, persistent nausea/vomiting with inability to tolerate food or liquids, persistent diarrhea, or if your pain is not controlled on your discharge pain medications.  FOLLOW-UP:  1. Please call to make a follow-up appointment within one week of discharge   2. Please follow up with your primary care physician in one week regarding your hospitalization.  WOUND CARE: Remove the outer bandages tomorrow.  Underneath are steri strips. Leave the white steri strips in place, they will fall off on their own in approximately 5-7 days. You may shower. Pat Dry abdomen.  BATHING: Please do not submerge wound underwater. You may shower and/or sponge bathe.  ACTIVITY: No heavy lifting anything more than 10-15lbs or straining. Otherwise, you may return to your usual level of physical activity.    NOTIFY YOUR SURGEON IF: You have any bleeding that does not stop, any pus draining from your wound, any fever (over 100.4 F) or chills, persistent nausea/vomiting with inability to tolerate food or liquids, persistent diarrhea, or if your pain is not controlled on your discharge pain medications.  FOLLOW-UP:  1. Please call to make a follow-up appointment within one week of discharge   2. Please follow up with your primary care physician in one week regarding your hospitalization.

## 2021-03-11 NOTE — DISCHARGE NOTE NURSING/CASE MANAGEMENT/SOCIAL WORK - PATIENT PORTAL LINK FT
You can access the FollowMyHealth Patient Portal offered by [facility] by registering at the following website: [email or web address]. By joining [facility]’s FollowMyHealth portal, you will also be able to view your health information using other applications (apps) compatible with our system.

## 2021-03-11 NOTE — DISCHARGE NOTE PROVIDER - HOSPITAL COURSE
On 3/10/21, Mr. Gerard who has a history of HTN, DM-2 , asthma and morbid obesity BMI 45.9, underwent Laparoscopic Sleeve Gastrectomy. Bariatric ERAS protocol followed to include preoperative and perioperative use of DVT and SSI prophylaxis as well as multi-modal non-opioid analgesia. Patient tolerated the procedure well  extubated in the operating room then transferred to the PACU in stable condition. Once hemodynamically stable and effective pain control the patient was able to ambulate with assistance. Pt was also able to void within 4 hours following the procedure. The patient was later transferred to a bariatric unit and placed on bedside continuous pulse oximetry. Pain management included IV multi-modal non-opioid analgesia then transitioned to liquid as needed. The patient tolerated bariatric clear liquid the evening of surgery.    On POD #1 patient remained stable with no acute events overnight, has effective  pain control. Denies nausea and vomiting. Patient is ambulating independently and voiding as expected. The rest of the hospital course was uneventful, patient met 8-Point Bariatric Surgery D/C criteria and subsequently cleared for discharge home on POD#1. Extended VTE prophylaxis not required ([facility] [place] Stratification Risk  (0.64%). The patient will follow a protocol-derived staged meal progression supervised by the dietitian in the outpatient setting. Patient will follow-up with Dr. Cronin in 7-10 days, medical doctor and dietitian in 30 days. All appropriate [facility] from [facility] prior to discharge. Written discharge instruction explained and given to include VTE prevention. On 3/10/21, Mr. Gerard who has a history of HTN, DM-2 , asthma and morbid obesity BMI 45.9, underwent Laparoscopic Sleeve Gastrectomy. Bariatric ERAS protocol followed to include preoperative and perioperative use of DVT and SSI prophylaxis as well as multi-modal non-opioid analgesia. Patient tolerated the procedure well  extubated in the operating room then transferred to the PACU in stable condition. Once hemodynamically stable and effective pain control the patient was able to ambulate with assistance. Pt was also able to void within 4 hours following the procedure. The patient was later transferred to a bariatric unit and placed on bedside continuous pulse oximetry. Pain management included IV multi-modal non-opioid analgesia then transitioned to liquid as needed. The patient tolerated bariatric clear liquid the evening of surgery.    On POD #1 patient remained stable with no acute events overnight, has effective  pain control. Denies nausea and vomiting. Patient is ambulating independently and voiding as expected. The rest of the hospital course was uneventful, patient met 8-Point Bariatric Surgery D/C criteria and subsequently cleared for discharge home on POD#1. Extended VTE prophylaxis not required ([facility] VTE Risk Stratification Low  (0.64%). The patient will follow a protocol-derived staged meal progression supervised by the dietitian in the outpatient setting. Patient will follow-up with Dr. Cronin in 7-10 days, medical doctor and dietitian in 30 days. All appropriate [facility] obtained from [facility] prior to discharge. Written discharge instruction explained and given to include VTE prevention.

## 2021-03-11 NOTE — DISCHARGE NOTE PROVIDER - NSDCCPCAREPLAN_GEN_ALL_CORE_FT
PRINCIPAL DISCHARGE DIAGNOSIS  Diagnosis: Morbid obesity  Assessment and Plan of Treatment: Lifestyle change to include weight loss surgery, dierary modification, increase physical activity      SECONDARY DISCHARGE DIAGNOSES  Diagnosis: ALEX on CPAP  Assessment and Plan of Treatment:     Diagnosis: DM (diabetes mellitus)  Assessment and Plan of Treatment:      PRINCIPAL DISCHARGE DIAGNOSIS  Diagnosis: Morbid obesity  Assessment and Plan of Treatment: Lifestyle change to include weight loss surgery, dierary modification, increase physical activity  Please follow up with your regular medical doctor in 1 week, please call to schedule an appointment to discuss recent hospitalization        SECONDARY DISCHARGE DIAGNOSES  Diagnosis: ALEX on CPAP  Assessment and Plan of Treatment:     Diagnosis: DM (diabetes mellitus)  Assessment and Plan of Treatment:

## 2021-03-11 NOTE — PROGRESS NOTE ADULT - SUBJECTIVE AND OBJECTIVE BOX
Post Op Day#: 1    Subjective: " Doing very well, no pain, drinking liquid, no N/V    Objective: No acute events overnight, effective pain control. Tolerating diet very well. Continuous pulse oximetry at bedside functioning, v/s stable, afebrile. Labs within acceptable range.  Ambulating independently around the unit last night. Tolerating bariatric clear liquid diet,   Voiding as expected.                                              Vital Signs Last 24 Hrs  T(C): 36.8 (11 Mar 2021 09:00), Max: 36.9 (10 Mar 2021 22:30)  T(F): 98.2 (11 Mar 2021 09:00), Max: 98.4 (10 Mar 2021 22:30)  HR: 95 (11 Mar 2021 09:00) (63 - 95)  BP: 144/70 (11 Mar 2021 09:00) (122/78 - 175/85)  BP(mean): 105 (10 Mar 2021 14:30) (80 - 118)  RR: 18 (11 Mar 2021 09:00) (18 - 18)  SpO2: 99% (11 Mar 2021 09:00) (95% - 100%)                                               I&O's Summary    10 Mar 2021 07:01  -  11 Mar 2021 07:00  --------------------------------------------------------  IN: 3650 mL / OUT: 1700 mL / NET: 1950 mL    11 Mar 2021 07:01  -  11 Mar 2021 09:52  --------------------------------------------------------  IN: 220 mL / OUT: 0 mL / NET: 220 mL                                                                          12.2   7.74  )-----------( 270      ( 11 Mar 2021 06:48 )             35.8                                                 03-11    136  |  104  |  10  ----------------------------<  90  3.8   |  21<L>  |  0.85    Ca    9.3      11 Mar 2021 06:43  Phos  3.1     03-11  Mg     2.3     03-11      acetaminophen  IVPB .. 1000 milliGRAM(s) IV Intermittent once  dextrose 40% Gel 15 Gram(s) Oral once  dextrose 5%. 1000 milliLiter(s) IV Continuous <Continuous>  dextrose 5%. 1000 milliLiter(s) IV Continuous <Continuous>  dextrose 50% Injectable 25 Gram(s) IV Push once  dextrose 50% Injectable 12.5 Gram(s) IV Push once  dextrose 50% Injectable 25 Gram(s) IV Push once  glucagon  Injectable 1 milliGRAM(s) IntraMuscular once  haloperidol IVPB 0.5 milliGRAM(s) IV Intermittent every 4 hours PRN  heparin   Injectable 5000 Unit(s) SubCutaneous every 8 hours  hyoscyamine SL 0.125 milliGRAM(s) SubLingual every 6 hours  insulin lispro (ADMELOG) corrective regimen sliding scale   SubCutaneous every 6 hours  ketorolac   Injectable 30 milliGRAM(s) IV Push every 6 hours  lactated ringers. 1000 milliLiter(s) IV Continuous <Continuous>  ondansetron Injectable 4 milliGRAM(s) IV Push every 6 hours  pantoprazole  Injectable 40 milliGRAM(s) IV Push every 24 hours  sodium chloride 0.9% 1000 milliLiter(s) IV Continuous <Continuous>      Physical Exam:         Lungs:  clear breath sounds b/l       Heart:  Regular rate & rhythm       Abdomen:  Soft, non-distended.  Scopes sites clean, dry and intact. + bs, - flatus, no rebound or guarding       Skin:  intact, pannus w/o rash       Extremities: + pulses, no edema, no calf tenderness, negative sweetie's     VTE Risk Assessment Scores             Caprini VTE Risk Score:                                                                (   ), Perioperative and Postoperative VTE prophylaxis   [facility]  VTE Predicted RISK LOW:   ( <1%  ), No post D/C extended  VTE prophylaxis      Assessment and Plan: Lap Sleeve Gastrectomy POD # 1    - Bariatric Clear diet today then protocol derived staged meal progression supervised by RD in outpatient setting  - DVT/GI prophylaxis, Incentive spirometry  - Ambulate as tolerated  -  Procedure specific education including diet, vitamins and VTE prevention, explained, written materials given  - Medication reviewed and reconciled, Bariatric meds obtained from [facility] prior to d/c  -  D/C home Bariatric 8-Point d/c criteria met  -  Follow up with Dr Cronin in 7-10 days, Dietitian and PMD in 30 days.      Marnie Lee, KRIS, ANP  [phone]

## 2021-03-11 NOTE — PROGRESS NOTE ADULT - SUBJECTIVE AND OBJECTIVE BOX
General Surgery AM Progress Note    Subjective:  Pt seen and examined at bedside this AM.   No acute events overnight.  Voiding appropriately  Denies chest pain, shortness of breath, dizziness, nausea, vomiting.    Vitals  Vital Signs Last 24 Hrs  T(C): 36.8 (11 Mar 2021 00:20), Max: 36.9 (10 Mar 2021 22:30)  T(F): 98.2 (11 Mar 2021 00:20), Max: 98.4 (10 Mar 2021 22:30)  HR: 87 (11 Mar 2021 01:09) (59 - 94)  BP: 128/71 (11 Mar 2021 00:20) (122/78 - 175/85)  BP(mean): 105 (10 Mar 2021 14:30) (80 - 118)  RR: 18 (11 Mar 2021 00:20) (18 - 18)  SpO2: 97% (11 Mar 2021 01:09) (95% - 100%)    Physical Exam:  General Appearance:  Appears well, NAD, AAO x3  HEENT: atraumatic, normocephalic,  Chest: Equal expansion bilaterally, equal breath sounds  CV: Pulse regular presently  Abdomen: Soft, nondistended, nontender, incisions healing well, dressings c/d/i  Extremities:  Grossly symmetric, warm and well perfused 4x.       I&O's Summary    10 Mar 2021 07:01  -  11 Mar 2021 02:53  --------------------------------------------------------  IN: 1450 mL / OUT: 1000 mL / NET: 450 mL         LABS:                        12.6   10.93 )-----------( 283      ( 10 Mar 2021 11:16 )             38.3     03-10    132<L>  |  98  |  15  ----------------------------<  137<H>  4.3   |  20<L>  |  0.89    Ca    9.3      10 Mar 2021 11:16  Phos  3.1     03-10  Mg     2.1     03-10            Medications  acetaminophen  IVPB .. 1000 milliGRAM(s) IV Intermittent once  acetaminophen  IVPB .. 1000 milliGRAM(s) IV Intermittent once  dextrose 40% Gel 15 Gram(s) Oral once  dextrose 5%. 1000 milliLiter(s) IV Continuous <Continuous>  dextrose 5%. 1000 milliLiter(s) IV Continuous <Continuous>  dextrose 50% Injectable 25 Gram(s) IV Push once  dextrose 50% Injectable 12.5 Gram(s) IV Push once  dextrose 50% Injectable 25 Gram(s) IV Push once  glucagon  Injectable 1 milliGRAM(s) IntraMuscular once  haloperidol IVPB 0.5 milliGRAM(s) IV Intermittent every 4 hours PRN  heparin   Injectable 5000 Unit(s) SubCutaneous every 8 hours  hyoscyamine SL 0.125 milliGRAM(s) SubLingual every 6 hours  insulin lispro (ADMELOG) corrective regimen sliding scale   SubCutaneous every 6 hours  ketorolac   Injectable 30 milliGRAM(s) IV Push every 6 hours  lactated ringers. 1000 milliLiter(s) IV Continuous <Continuous>  ondansetron Injectable 4 milliGRAM(s) IV Push every 6 hours  pantoprazole  Injectable 40 milliGRAM(s) IV Push every 24 hours  sodium chloride 0.9% 1000 milliLiter(s) IV Continuous <Continuous>    amLODIPine 5 mg oral tablet: 1 tab(s) orally once a day  lisinopril 40 mg oral tablet: 1 tab(s) orally once a day  metFORMIN 1000 mg oral tablet: 1 tab(s) orally once a day, last dose 3/09 am      RADIOLOGY & ADDITIONAL STUDIES:   General Surgery AM Progress Note    Subjective:  Pt seen and examined at bedside this AM and is resting comfortably.   No acute events overnight.  Voiding appropriately.  Tolerating CLD.  Endorses better pain control.  Denies chest pain, shortness of breath, dizziness, nausea, vomiting.    Vitals  Vital Signs Last 24 Hrs  T(C): 36.8 (11 Mar 2021 00:20), Max: 36.9 (10 Mar 2021 22:30)  T(F): 98.2 (11 Mar 2021 00:20), Max: 98.4 (10 Mar 2021 22:30)  HR: 87 (11 Mar 2021 01:09) (59 - 94)  BP: 128/71 (11 Mar 2021 00:20) (122/78 - 175/85)  BP(mean): 105 (10 Mar 2021 14:30) (80 - 118)  RR: 18 (11 Mar 2021 00:20) (18 - 18)  SpO2: 97% (11 Mar 2021 01:09) (95% - 100%)    Physical Exam:  General Appearance:  Appears well, NAD, AAO x3  HEENT: atraumatic, normocephalic,  Chest: Equal expansion bilaterally, equal breath sounds  CV: Pulse regular presently  Abdomen: Soft, nondistended, nontender, incisions healing well, dressings c/d/i  Extremities:  Grossly symmetric, warm and well perfused 4x.       I&O's Summary    10 Mar 2021 07:01  -  11 Mar 2021 02:53  --------------------------------------------------------  IN: 1450 mL / OUT: 1000 mL / NET: 450 mL         LABS:                        12.6   10.93 )-----------( 283      ( 10 Mar 2021 11:16 )             38.3     03-10    132<L>  |  98  |  15  ----------------------------<  137<H>  4.3   |  20<L>  |  0.89    Ca    9.3      10 Mar 2021 11:16  Phos  3.1     03-10  Mg     2.1     03-10            Medications  acetaminophen  IVPB .. 1000 milliGRAM(s) IV Intermittent once  acetaminophen  IVPB .. 1000 milliGRAM(s) IV Intermittent once  dextrose 40% Gel 15 Gram(s) Oral once  dextrose 5%. 1000 milliLiter(s) IV Continuous <Continuous>  dextrose 5%. 1000 milliLiter(s) IV Continuous <Continuous>  dextrose 50% Injectable 25 Gram(s) IV Push once  dextrose 50% Injectable 12.5 Gram(s) IV Push once  dextrose 50% Injectable 25 Gram(s) IV Push once  glucagon  Injectable 1 milliGRAM(s) IntraMuscular once  haloperidol IVPB 0.5 milliGRAM(s) IV Intermittent every 4 hours PRN  heparin   Injectable 5000 Unit(s) SubCutaneous every 8 hours  hyoscyamine SL 0.125 milliGRAM(s) SubLingual every 6 hours  insulin lispro (ADMELOG) corrective regimen sliding scale   SubCutaneous every 6 hours  ketorolac   Injectable 30 milliGRAM(s) IV Push every 6 hours  lactated ringers. 1000 milliLiter(s) IV Continuous <Continuous>  ondansetron Injectable 4 milliGRAM(s) IV Push every 6 hours  pantoprazole  Injectable 40 milliGRAM(s) IV Push every 24 hours  sodium chloride 0.9% 1000 milliLiter(s) IV Continuous <Continuous>    amLODIPine 5 mg oral tablet: 1 tab(s) orally once a day  lisinopril 40 mg oral tablet: 1 tab(s) orally once a day  metFORMIN 1000 mg oral tablet: 1 tab(s) orally once a day, last dose 3/09 am      RADIOLOGY & ADDITIONAL STUDIES:

## 2021-03-12 LAB — SURGICAL PATHOLOGY STUDY: SIGNIFICANT CHANGE UP

## 2021-03-22 ENCOUNTER — APPOINTMENT (OUTPATIENT)
Dept: SURGERY | Facility: CLINIC | Age: 31
End: 2021-03-22
Payer: COMMERCIAL

## 2021-03-22 VITALS
OXYGEN SATURATION: 97 % | DIASTOLIC BLOOD PRESSURE: 89 MMHG | BODY MASS INDEX: 42.22 KG/M2 | HEART RATE: 91 BPM | TEMPERATURE: 98 F | RESPIRATION RATE: 16 BRPM | WEIGHT: 278.6 LBS | HEIGHT: 68 IN | SYSTOLIC BLOOD PRESSURE: 136 MMHG

## 2021-03-22 DIAGNOSIS — Z01.818 ENCOUNTER FOR OTHER PREPROCEDURAL EXAMINATION: ICD-10-CM

## 2021-03-22 DIAGNOSIS — Z86.39 PERSONAL HISTORY OF OTHER ENDOCRINE, NUTRITIONAL AND METABOLIC DISEASE: ICD-10-CM

## 2021-03-22 DIAGNOSIS — R73.09 OTHER ABNORMAL GLUCOSE: ICD-10-CM

## 2021-03-22 DIAGNOSIS — U07.1 COVID-19: ICD-10-CM

## 2021-03-22 DIAGNOSIS — Z98.890 OTHER SPECIFIED POSTPROCEDURAL STATES: ICD-10-CM

## 2021-03-22 PROCEDURE — 99024 POSTOP FOLLOW-UP VISIT: CPT

## 2021-03-22 RX ORDER — LISINOPRIL 40 MG/1
40 TABLET ORAL
Qty: 90 | Refills: 1 | Status: DISCONTINUED | COMMUNITY
Start: 2020-12-28 | End: 2021-03-22

## 2021-03-22 RX ORDER — AMLODIPINE BESYLATE 5 MG/1
5 TABLET ORAL
Qty: 90 | Refills: 0 | Status: DISCONTINUED | COMMUNITY
End: 2021-03-22

## 2021-03-22 RX ORDER — METFORMIN HYDROCHLORIDE 1000 MG/1
1000 TABLET, COATED ORAL DAILY
Refills: 0 | Status: DISCONTINUED | COMMUNITY
End: 2021-03-22

## 2021-03-22 RX ORDER — MULTIVIT-MIN/IRON/FOLIC ACID/K 18-600-40
CAPSULE ORAL
Refills: 0 | Status: ACTIVE | COMMUNITY

## 2021-05-24 ENCOUNTER — APPOINTMENT (OUTPATIENT)
Dept: SURGERY | Facility: CLINIC | Age: 31
End: 2021-05-24
Payer: COMMERCIAL

## 2021-05-24 VITALS
HEIGHT: 68 IN | TEMPERATURE: 98.1 F | HEART RATE: 69 BPM | DIASTOLIC BLOOD PRESSURE: 85 MMHG | RESPIRATION RATE: 17 BRPM | WEIGHT: 245 LBS | BODY MASS INDEX: 37.13 KG/M2 | OXYGEN SATURATION: 98 % | SYSTOLIC BLOOD PRESSURE: 146 MMHG

## 2021-05-24 PROCEDURE — 99024 POSTOP FOLLOW-UP VISIT: CPT

## 2021-08-16 ENCOUNTER — APPOINTMENT (OUTPATIENT)
Dept: CARDIOLOGY | Facility: CLINIC | Age: 31
End: 2021-08-16

## 2021-08-16 DIAGNOSIS — Z86.16 PERSONAL HISTORY OF COVID-19: ICD-10-CM

## 2021-08-16 DIAGNOSIS — I10 ESSENTIAL (PRIMARY) HYPERTENSION: ICD-10-CM

## 2021-08-16 DIAGNOSIS — E78.00 PURE HYPERCHOLESTEROLEMIA, UNSPECIFIED: ICD-10-CM

## 2021-08-16 NOTE — PHYSICAL EXAM
[Well Developed] : well developed [Well Nourished] : well nourished [No Acute Distress] : no acute distress [Normal Venous Pressure] : normal venous pressure [No Carotid Bruit] : no carotid bruit [Normal S1, S2] : normal S1, S2 [No Murmur] : no murmur [No Rub] : no rub [No Gallop] : no gallop [Clear Lung Fields] : clear lung fields [Good Air Entry] : good air entry [No Respiratory Distress] : no respiratory distress  [Soft] : abdomen soft [Non Tender] : non-tender [No Masses/organomegaly] : no masses/organomegaly [Normal Bowel Sounds] : normal bowel sounds [Normal Gait] : normal gait [No Edema] : no edema [No Cyanosis] : no cyanosis [No Clubbing] : no clubbing [No Varicosities] : no varicosities [No Rash] : no rash [No Skin Lesions] : no skin lesions [Moves all extremities] : moves all extremities [No Focal Deficits] : no focal deficits [Normal Speech] : normal speech [Alert and Oriented] : alert and oriented [Normal memory] : normal memory [General Appearance - Well Developed] : well developed [Normal Appearance] : normal appearance [Well Groomed] : well groomed [General Appearance - Well Nourished] : well nourished [No Deformities] : no deformities [General Appearance - In No Acute Distress] : no acute distress [Normal Conjunctiva] : the conjunctiva exhibited no abnormalities [Normal Oral Mucosa] : normal oral mucosa [Normal Oropharynx] : normal oropharynx [Normal Jugular Venous A Waves Present] : normal jugular venous A waves present [Normal Jugular Venous V Waves Present] : normal jugular venous V waves present [No Jugular Venous Crockett A Waves] : no jugular venous crockett A waves [Respiration, Rhythm And Depth] : normal respiratory rhythm and effort [Exaggerated Use Of Accessory Muscles For Inspiration] : no accessory muscle use [Auscultation Breath Sounds / Voice Sounds] : lungs were clear to auscultation bilaterally [Bowel Sounds] : normal bowel sounds [Abdomen Soft] : soft [Abnormal Walk] : normal gait [Gait - Sufficient For Exercise Testing] : the gait was sufficient for exercise testing [Nail Clubbing] : no clubbing of the fingernails [Cyanosis, Localized] : no localized cyanosis [Petechial Hemorrhages (___cm)] : no petechial hemorrhages [Skin Color & Pigmentation] : normal skin color and pigmentation [Skin Turgor] : normal skin turgor [] : no rash [Oriented To Time, Place, And Person] : oriented to person, place, and time [Affect] : the affect was normal [Mood] : the mood was normal [No Anxiety] : not feeling anxious [5th Left ICS - MCL] : palpated at the 5th LICS in the midclavicular line [Normal] : normal [No Precordial Heave] : no precordial heave was noted [Normal Rate] : normal [Normal S1] : normal S1 [Normal S2] : normal S2 [S3] : no S3 [I] : a grade 1 [S4] : no S4 [Left Carotid Bruit] : no bruit heard over the left carotid [Right Carotid Bruit] : no bruit heard over the right carotid [Right Femoral Bruit] : no bruit heard over the right femoral artery [Left Femoral Bruit] : no bruit heard over the left femoral artery [2+] : left 2+ [No Abnormalities] : the abdominal aorta was not enlarged and no bruit was heard [No Pitting Edema] : no pitting edema present

## 2021-08-16 NOTE — DISCUSSION/SUMMARY
[FreeTextEntry1] : This is a 30-year-old male with past medical history significant for hypertension, non-insulin-dependent diabetes mellitus, statin post COVID-19 infection in March 2020, who comes in for cardiac preoperative evaluation.  The patient is scheduled for bariatric surgery within the next month.\par The patient had a normal exercise stress test December 30, 2020.\par The patient had a normal ejection fraction greater than 60%.\par He denies chest pain, shortness of breath, dizziness or syncope.  He has no history of rheumatic fever.  He does not drink excessive caffeine or alcohol.\par His cardiac risk factors include hypertension, non-insulin-dependent diabetes mellitus, and obesity.\par The patient does get occasional dyspnea on exertion.\par Electrocardiogram done December 23, 2020 demonstrated normal sinus rhythm rate of 90 bpm is otherwise unremarkable.\par \par The patient is cleared from a cardiac standpoint for bariatric surgery pending acceptable preoperative laboratory values.  The patient should be allowed to take his usual medications in the perioperative period.  Avoid overhydration.  Maintain prophylaxis for deep venous thrombosis.  The patient should have an incentive spirometer in the perioperative period.\par \par The patient will follow up with me in 4 months.\par Lifestyle modification including regular aerobic activity was reinforced.\par The patient understands that aerobic exercises must be increased to 40 minutes 4 times per week. A detailed discussion of lifestyle modification was done today. The patient has a good understanding of the diagnosis, and treatment plan. Lifestyle modification was also outlined.

## 2021-08-20 ENCOUNTER — APPOINTMENT (OUTPATIENT)
Dept: SURGERY | Facility: CLINIC | Age: 31
End: 2021-08-20
Payer: COMMERCIAL

## 2021-08-20 DIAGNOSIS — E66.01 MORBID (SEVERE) OBESITY DUE TO EXCESS CALORIES: ICD-10-CM

## 2021-08-20 DIAGNOSIS — Z09 ENCOUNTER FOR FOLLOW-UP EXAMINATION AFTER COMPLETED TREATMENT FOR CONDITIONS OTHER THAN MALIGNANT NEOPLASM: ICD-10-CM

## 2021-08-20 PROCEDURE — 99214 OFFICE O/P EST MOD 30 MIN: CPT

## 2021-09-01 ENCOUNTER — TRANSCRIPTION ENCOUNTER (OUTPATIENT)
Age: 31
End: 2021-09-01

## 2021-12-17 ENCOUNTER — APPOINTMENT (OUTPATIENT)
Dept: SURGERY | Facility: CLINIC | Age: 31
End: 2021-12-17

## 2022-03-02 ENCOUNTER — TRANSCRIPTION ENCOUNTER (OUTPATIENT)
Age: 32
End: 2022-03-02

## 2022-03-05 ENCOUNTER — TRANSCRIPTION ENCOUNTER (OUTPATIENT)
Age: 32
End: 2022-03-05

## 2022-05-22 NOTE — ED ADULT NURSE NOTE - NS ED NURSE LEVEL OF CONSCIOUSNESS MENTAL STATUS
VBG ran at this time.  Results as follows- pH 7.399, Co2 36.3, pO2 32.1, HCO3 22.5, BE -1.9, spo2 62% Awake/Responds to pain

## 2022-07-09 ENCOUNTER — NON-APPOINTMENT (OUTPATIENT)
Age: 32
End: 2022-07-09

## 2022-08-23 ENCOUNTER — OUTPATIENT (OUTPATIENT)
Dept: OUTPATIENT SERVICES | Facility: HOSPITAL | Age: 32
LOS: 1 days | End: 2022-08-23
Payer: COMMERCIAL

## 2022-08-23 DIAGNOSIS — Z00.00 ENCOUNTER FOR GENERAL ADULT MEDICAL EXAMINATION WITHOUT ABNORMAL FINDINGS: ICD-10-CM

## 2022-08-23 PROCEDURE — 71046 X-RAY EXAM CHEST 2 VIEWS: CPT | Mod: 26

## 2022-08-23 PROCEDURE — 71046 X-RAY EXAM CHEST 2 VIEWS: CPT

## 2022-11-15 NOTE — H&P PST ADULT - MARITAL STATUS
chlorhexidine/Adherence to aseptic technique: hand hygiene prior to donning barriers (gown, gloves), don cap and mask, sterile drape over patient
Single

## 2023-01-03 ENCOUNTER — NON-APPOINTMENT (OUTPATIENT)
Age: 33
End: 2023-01-03

## 2023-03-24 ENCOUNTER — NON-APPOINTMENT (OUTPATIENT)
Age: 33
End: 2023-03-24

## 2023-05-17 NOTE — H&P PST ADULT - BMI (KG/M2)
Dr. Yousif, ( tcm )    For your review. No VOF. This is needed. Enmanuel is the daughter       Call to pt's pharmacy at  [address] [phone]    Spoke to pharmacist.    Full name of patient and  verified.    States the metoprolol, amlodipine, lisinopril, were filled as ordered and picked up .    Said the Omeprazole 20 MG Tablet Delayed Release Dispersible was not filled because it is not available and is not covered by insurance.    Pharmacist suggests Lansoprazole 30 mg odt. Said order is needed to see if it is covered by insurance.    RN advised pharmacist that only phone number available for pt is [phone] (Mobile). No VOF             47.4

## 2023-12-11 ENCOUNTER — NON-APPOINTMENT (OUTPATIENT)
Age: 33
End: 2023-12-11

## 2023-12-19 NOTE — ED PROVIDER NOTE - EXITCARE/DISCHARGE INSTRUCTIONS
PAST MEDICAL HISTORY:  Atherosclerosis     CAD (coronary artery disease)     CKD (chronic kidney disease)     Cyst of pancreas     DM (diabetes mellitus)     H/O CHF     HLD (hyperlipidemia)     HTN (hypertension)     HTN (hypertension)     Hyperkalemia     Stage 4 chronic kidney disease      Launch [employer] and print the 'Prescriptions from this Visit' Report

## 2024-01-03 ENCOUNTER — NON-APPOINTMENT (OUTPATIENT)
Age: 34
End: 2024-01-03

## 2024-04-29 NOTE — ED ADULT NURSE NOTE - DOES PATIENT HAVE ADVANCE DIRECTIVE
Call Center TCM Note      Flowsheet Row Responses   [facility] facility patient discharged from? [facility]   Does the patient have one of the following disease processes/diagnoses(primary or secondary)? Stroke   TCM attempt successful? No  [VR-Dtr, Daiana Skelton]   Unsuccessful attempts Attempt 1            Coty Snow RN    4/29/2024, 14:23 EDT         No

## 2024-07-12 ENCOUNTER — APPOINTMENT (OUTPATIENT)
Dept: SURGERY | Facility: CLINIC | Age: 34
End: 2024-07-12
Payer: COMMERCIAL

## 2024-07-12 VITALS
HEIGHT: 68 IN | BODY MASS INDEX: 37.74 KG/M2 | RESPIRATION RATE: 16 BRPM | WEIGHT: 249 LBS | OXYGEN SATURATION: 99 % | SYSTOLIC BLOOD PRESSURE: 142 MMHG | TEMPERATURE: 97 F | HEART RATE: 74 BPM | DIASTOLIC BLOOD PRESSURE: 87 MMHG

## 2024-07-12 DIAGNOSIS — E66.01 MORBID (SEVERE) OBESITY DUE TO EXCESS CALORIES: ICD-10-CM

## 2024-07-12 LAB
AMYLASE/CREAT SERPL: 50 U/L
LPL SERPL-CCNC: 15 U/L

## 2024-07-12 PROCEDURE — 99214 OFFICE O/P EST MOD 30 MIN: CPT

## 2024-07-14 LAB
ESTIMATED AVERAGE GLUCOSE: 114 MG/DL
HBA1C MFR BLD HPLC: 5.6 %

## 2024-07-15 LAB
25(OH)D3 SERPL-MCNC: 25.7 NG/ML
ALBUMIN SERPL ELPH-MCNC: 4.5 G/DL
ALP BLD-CCNC: 51 U/L
ALT SERPL-CCNC: 13 U/L
ANION GAP SERPL CALC-SCNC: 12 MMOL/L
AST SERPL-CCNC: 13 U/L
BILIRUB DIRECT SERPL-MCNC: 0.1 MG/DL
BILIRUB INDIRECT SERPL-MCNC: 0.3 MG/DL
BILIRUB SERPL-MCNC: 0.5 MG/DL
BUN SERPL-MCNC: 10 MG/DL
CALCIUM SERPL-MCNC: 9.6 MG/DL
CHLORIDE SERPL-SCNC: 105 MMOL/L
CO2 SERPL-SCNC: 24 MMOL/L
CREAT SERPL-MCNC: 0.87 MG/DL
EGFR: 117 ML/MIN/1.73M2
FERRITIN SERPL-MCNC: 155 NG/ML
FOLATE SERPL-MCNC: 14.6 NG/ML
GLUCOSE SERPL-MCNC: 91 MG/DL
IRON SATN MFR SERPL: 23 %
IRON SERPL-MCNC: 61 UG/DL
POTASSIUM SERPL-SCNC: 4.2 MMOL/L
PROT SERPL-MCNC: 7.4 G/DL
SODIUM SERPL-SCNC: 141 MMOL/L
TIBC SERPL-MCNC: 261 UG/DL
UIBC SERPL-MCNC: 200 UG/DL
VIT B12 SERPL-MCNC: 502 PG/ML

## 2024-07-19 RX ORDER — TIRZEPATIDE 2.5 MG/.5ML
2.5 INJECTION, SOLUTION SUBCUTANEOUS
Qty: 4 | Refills: 0 | Status: ACTIVE | COMMUNITY
Start: 2024-07-12

## 2024-09-16 ENCOUNTER — APPOINTMENT (OUTPATIENT)
Dept: SURGERY | Facility: CLINIC | Age: 34
End: 2024-09-16
Payer: COMMERCIAL

## 2024-09-16 VITALS
TEMPERATURE: 95.3 F | OXYGEN SATURATION: 97 % | HEART RATE: 77 BPM | BODY MASS INDEX: 36.07 KG/M2 | HEIGHT: 68 IN | RESPIRATION RATE: 17 BRPM | DIASTOLIC BLOOD PRESSURE: 89 MMHG | WEIGHT: 238 LBS | SYSTOLIC BLOOD PRESSURE: 135 MMHG

## 2024-09-16 DIAGNOSIS — E66.01 MORBID (SEVERE) OBESITY DUE TO EXCESS CALORIES: ICD-10-CM

## 2024-09-16 PROCEDURE — 99214 OFFICE O/P EST MOD 30 MIN: CPT

## 2024-11-11 DIAGNOSIS — E66.01 MORBID (SEVERE) OBESITY DUE TO EXCESS CALORIES: ICD-10-CM

## 2024-11-12 ENCOUNTER — LABORATORY RESULT (OUTPATIENT)
Age: 34
End: 2024-11-12

## 2024-11-12 LAB
ALBUMIN SERPL ELPH-MCNC: 4.7 G/DL
ALP BLD-CCNC: 56 U/L
ALT SERPL-CCNC: 11 U/L
AMYLASE/CREAT SERPL: 62 U/L
ANION GAP SERPL CALC-SCNC: 13 MMOL/L
AST SERPL-CCNC: 13 U/L
BASOPHILS # BLD AUTO: 0.01 K/UL
BASOPHILS NFR BLD AUTO: 0.2 %
BILIRUB SERPL-MCNC: 0.9 MG/DL
BUN SERPL-MCNC: 9 MG/DL
CALCIUM SERPL-MCNC: 10.2 MG/DL
CHLORIDE SERPL-SCNC: 107 MMOL/L
CHOLEST SERPL-MCNC: 182 MG/DL
CO2 SERPL-SCNC: 24 MMOL/L
CREAT SERPL-MCNC: 0.99 MG/DL
EGFR: 103 ML/MIN/1.73M2
EOSINOPHIL # BLD AUTO: 0.01 K/UL
EOSINOPHIL NFR BLD AUTO: 0.2 %
GLUCOSE SERPL-MCNC: 100 MG/DL
HCT VFR BLD CALC: 45.4 %
HDLC SERPL-MCNC: 50 MG/DL
HGB BLD-MCNC: 15.5 G/DL
IMM GRANULOCYTES NFR BLD AUTO: 0.4 %
LDLC SERPL CALC-MCNC: 119 MG/DL
LPL SERPL-CCNC: 21 U/L
LYMPHOCYTES # BLD AUTO: 1.51 K/UL
LYMPHOCYTES NFR BLD AUTO: 26.6 %
MAN DIFF?: NORMAL
MCHC RBC-ENTMCNC: 29.8 PG
MCHC RBC-ENTMCNC: 34.1 G/DL
MCV RBC AUTO: 87.1 FL
MONOCYTES # BLD AUTO: 0.23 K/UL
MONOCYTES NFR BLD AUTO: 4 %
NEUTROPHILS # BLD AUTO: 3.9 K/UL
NEUTROPHILS NFR BLD AUTO: 68.6 %
NONHDLC SERPL-MCNC: 132 MG/DL
PLATELET # BLD AUTO: 296 K/UL
POTASSIUM SERPL-SCNC: 4.1 MMOL/L
PROT SERPL-MCNC: 8.1 G/DL
RBC # BLD: 5.21 M/UL
RBC # FLD: 13.4 %
SODIUM SERPL-SCNC: 144 MMOL/L
TRIGL SERPL-MCNC: 69 MG/DL
WBC # FLD AUTO: 5.68 K/UL

## 2024-11-26 ENCOUNTER — RX RENEWAL (OUTPATIENT)
Age: 34
End: 2024-11-26

## 2025-04-30 NOTE — CONSULT LETTER
[FreeTextEntry3] : Krzysztof Cronin MD, FACS, FASMBS\par Chief Division of Minimally Invasive Surgery\par Co-Director of Bariatric Surgery \par [facility]\[place] [address] Asc Procedure Text (F): After obtaining clear surgical margins the patient was sent to an ASC for surgical repair.  The patient understands they will receive post-surgical care and follow-up from the ASC physician.

## 2025-07-10 NOTE — ED ADULT NURSE NOTE - PAIN RATING/NUMBER SCALE (0-10): REST
UPDATE H&P NOTE         HISTORY OF PRESENT ILLNESS    Pt presents for CRC screening    MEDICATIONS    Prior to Admission medications    Medication Sig Start Date End Date Taking? Authorizing Provider   atorvastatin (LIPITOR) 20 MG tablet Take 1 tablet by mouth daily. 5/20/25   Edmundo Cardoso MD   Creatine Monohydrate Powder Take 10 g by mouth 5 days a week.    Provider, Outside   finasteride (PROSCAR) 5 MG tablet Take 5 mg by mouth daily. As directed 5/22/24   Provider, Outside   tadalafil (CIALIS) 5 MG tablet Take 1 tablet by mouth as needed. 3/9/23   Provider, Outside   mirtazapine (REMERON) 30 MG tablet Take 30 mg by mouth at bedtime. 3/8/23   Provider, Outside   amphetamine-dextroamphetamine (ADDERALL) 20 MG tablet Take 1 tablet by mouth 3 times daily. 3/3/23   Provider, Outside   clonazePAM (KlonoPIN) 1 MG tablet Take 1 mg by mouth in the morning and 1 mg in the evening. 2/14/23   Provider, Outside         ALLERGIES  ALLERGIES:  Patient has no known allergies.    HISTORIES  Past Medical History:   Diagnosis Date    Abnormal results of liver function studies 2019    Abnormal transaminases 2019    Acute left-sided low back pain with left-sided sciatica     ADHD     Allergic rhinitis     Depression     Elevated prostate specific antigen (PSA) 01/01/2019    Generalized anxiety disorder 05/10/2023    Hernia, umbilical 2019    12/11/2019 CT abdomen and pelvis. [facility]. Tiny fat-containing periumbilical hernia. No other hernias identified. Mild enlargement of prostate gland. Otherwise normal CT.    High cholesterol     Vitamin D deficiency, unspecified 05/10/2023       Past Surgical History:   Procedure Laterality Date    Biopsy of prostate,needle/punch  06/2024    Dr Frias. negative    No past surgeries         Family History   Problem Relation Age of Onset    Hypertension Father     Coronary Artery Disease Father     Hyperlipidemia Father     Cancer, Prostate Father 68    Cancer, Lung  Father     Asthma Sister     Patient is unaware of any medical problems Maternal Grandmother     Patient is unaware of any medical problems Maternal Grandfather     Patient is unaware of any medical problems Paternal Grandmother     Patient is unaware of any medical problems Paternal Grandfather        Social History     Tobacco Use    Smoking status: Never    Smokeless tobacco: Never   Vaping Use    Vaping status: never used   Substance Use Topics    Alcohol use: Yes     Alcohol/week: 5.0 standard drinks of alcohol     Types: 5 Standard drinks or equivalent per week    Drug use: Yes     Types: Marijuana             PHYSICAL EXAM  Vitals:  There were no vitals taken for this visit. (see nurses note for current vitals)  Constitutional:  General appearance without acute distress  Skin:  No jaundice on inspection.  Skin is warm and dry on palpation  Eyes:  Normal conjunctiva, anicteric  Oral:  Lips, teeth and gums normal on inspection  Neck: trachea midline, no cervical nodes /masses  Pulmonary:  Clear to auscultation bilaterally good respiratory effort  Cardiovascular:  Regular rate on auscultation.  No lower extremity edema  Abdomen:  Positive bowel sound, soft, non-tender, no distention.  No hepatosplenomegaly  Extremities: No pedal edema   Neuro: No gross motor deficit, movements coordinated  Psych:  Mood and affect were appropriate.  Judgement and insight appear appropriate      ASSESSMENT/PLAN    CRC screening  Plan colonoscopy      Edmundo Gallego MD      6

## 2025-07-12 ENCOUNTER — NON-APPOINTMENT (OUTPATIENT)
Age: 35
End: 2025-07-12

## 2025-07-23 ENCOUNTER — APPOINTMENT (OUTPATIENT)
Dept: SURGERY | Facility: CLINIC | Age: 35
End: 2025-07-23
Payer: COMMERCIAL

## 2025-07-23 PROCEDURE — 99214 OFFICE O/P EST MOD 30 MIN: CPT | Mod: 95
